# Patient Record
Sex: MALE | Race: OTHER | URBAN - METROPOLITAN AREA
[De-identification: names, ages, dates, MRNs, and addresses within clinical notes are randomized per-mention and may not be internally consistent; named-entity substitution may affect disease eponyms.]

---

## 2019-01-25 ENCOUNTER — INPATIENT (INPATIENT)
Facility: HOSPITAL | Age: 69
LOS: 5 days | Discharge: HOME HEALTH | End: 2019-01-31
Attending: INTERNAL MEDICINE | Admitting: INTERNAL MEDICINE
Payer: MEDICARE

## 2019-01-25 VITALS
RESPIRATION RATE: 20 BRPM | HEART RATE: 64 BPM | OXYGEN SATURATION: 95 % | SYSTOLIC BLOOD PRESSURE: 104 MMHG | DIASTOLIC BLOOD PRESSURE: 55 MMHG

## 2019-01-25 DIAGNOSIS — Z98.890 OTHER SPECIFIED POSTPROCEDURAL STATES: Chronic | ICD-10-CM
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
## 2019-01-25 DIAGNOSIS — Z95.2 PRESENCE OF PROSTHETIC HEART VALVE: Chronic | ICD-10-CM

## 2019-01-25 LAB
ALBUMIN SERPL ELPH-MCNC: 3.8 G/DL — SIGNIFICANT CHANGE UP (ref 3.5–5.2)
ALP SERPL-CCNC: 88 U/L — SIGNIFICANT CHANGE UP (ref 30–115)
ALT FLD-CCNC: 30 U/L — SIGNIFICANT CHANGE UP (ref 0–41)
ANION GAP SERPL CALC-SCNC: 14 MMOL/L — SIGNIFICANT CHANGE UP (ref 7–14)
APPEARANCE UR: CLEAR — SIGNIFICANT CHANGE UP
APTT BLD: 33.8 SEC — SIGNIFICANT CHANGE UP (ref 27–39.2)
AST SERPL-CCNC: 34 U/L — SIGNIFICANT CHANGE UP (ref 0–41)
BASE EXCESS BLDV CALC-SCNC: 17.1 MMOL/L — HIGH (ref -2–2)
BASE EXCESS BLDV CALC-SCNC: 17.9 MMOL/L — HIGH (ref -2–2)
BASOPHILS # BLD AUTO: 0.02 K/UL — SIGNIFICANT CHANGE UP (ref 0–0.2)
BASOPHILS NFR BLD AUTO: 0.3 % — SIGNIFICANT CHANGE UP (ref 0–1)
BILIRUB SERPL-MCNC: 1.5 MG/DL — HIGH (ref 0.2–1.2)
BILIRUB UR-MCNC: NEGATIVE — SIGNIFICANT CHANGE UP
BLD GP AB SCN SERPL QL: SIGNIFICANT CHANGE UP
BUN SERPL-MCNC: 94 MG/DL — CRITICAL HIGH (ref 10–20)
CA-I SERPL-SCNC: 1.08 MMOL/L — LOW (ref 1.12–1.3)
CA-I SERPL-SCNC: 1.11 MMOL/L — LOW (ref 1.12–1.3)
CALCIUM SERPL-MCNC: 9.1 MG/DL — SIGNIFICANT CHANGE UP (ref 8.5–10.1)
CHLORIDE SERPL-SCNC: 84 MMOL/L — LOW (ref 98–110)
CO2 SERPL-SCNC: 39 MMOL/L — HIGH (ref 17–32)
COLOR SPEC: YELLOW — SIGNIFICANT CHANGE UP
CREAT SERPL-MCNC: 3.3 MG/DL — HIGH (ref 0.7–1.5)
DIFF PNL FLD: NEGATIVE — SIGNIFICANT CHANGE UP
EOSINOPHIL # BLD AUTO: 0.11 K/UL — SIGNIFICANT CHANGE UP (ref 0–0.7)
EOSINOPHIL NFR BLD AUTO: 1.7 % — SIGNIFICANT CHANGE UP (ref 0–8)
ETHANOL SERPL-MCNC: <10 MG/DL — HIGH
GAS PNL BLDV: 134 MMOL/L — LOW (ref 136–145)
GAS PNL BLDV: 136 MMOL/L — SIGNIFICANT CHANGE UP (ref 136–145)
GAS PNL BLDV: SIGNIFICANT CHANGE UP
GAS PNL BLDV: SIGNIFICANT CHANGE UP
GLUCOSE BLDC GLUCOMTR-MCNC: 164 MG/DL — HIGH (ref 70–99)
GLUCOSE BLDC GLUCOMTR-MCNC: 243 MG/DL — HIGH (ref 70–99)
GLUCOSE SERPL-MCNC: 199 MG/DL — HIGH (ref 70–99)
GLUCOSE UR QL: NEGATIVE MG/DL — SIGNIFICANT CHANGE UP
HCO3 BLDV-SCNC: 44 MMOL/L — HIGH (ref 22–29)
HCO3 BLDV-SCNC: 44 MMOL/L — HIGH (ref 22–29)
HCT VFR BLD CALC: 35.6 % — LOW (ref 42–52)
HCT VFR BLDA CALC: 26.6 % — LOW (ref 34–44)
HCT VFR BLDA CALC: 37 % — SIGNIFICANT CHANGE UP (ref 34–44)
HGB BLD CALC-MCNC: 12.1 G/DL — LOW (ref 14–18)
HGB BLD CALC-MCNC: 8.7 G/DL — LOW (ref 14–18)
HGB BLD-MCNC: 11.5 G/DL — LOW (ref 14–18)
IMM GRANULOCYTES NFR BLD AUTO: 0.5 % — HIGH (ref 0.1–0.3)
INR BLD: 1.27 RATIO — SIGNIFICANT CHANGE UP (ref 0.65–1.3)
KETONES UR-MCNC: NEGATIVE — SIGNIFICANT CHANGE UP
LACTATE BLDV-MCNC: 2 MMOL/L — HIGH (ref 0.5–1.6)
LACTATE BLDV-MCNC: 2.8 MMOL/L — HIGH (ref 0.5–1.6)
LACTATE SERPL-SCNC: 2.5 MMOL/L — HIGH (ref 0.5–2.2)
LEUKOCYTE ESTERASE UR-ACNC: NEGATIVE — SIGNIFICANT CHANGE UP
LIDOCAIN IGE QN: 43 U/L — SIGNIFICANT CHANGE UP (ref 7–60)
LYMPHOCYTES # BLD AUTO: 1 K/UL — LOW (ref 1.2–3.4)
LYMPHOCYTES # BLD AUTO: 15.3 % — LOW (ref 20.5–51.1)
MAGNESIUM SERPL-MCNC: 3.2 MG/DL — CRITICAL HIGH (ref 1.8–2.4)
MCHC RBC-ENTMCNC: 30.4 PG — SIGNIFICANT CHANGE UP (ref 27–31)
MCHC RBC-ENTMCNC: 32.3 G/DL — SIGNIFICANT CHANGE UP (ref 32–37)
MCV RBC AUTO: 94.2 FL — HIGH (ref 80–94)
MONOCYTES # BLD AUTO: 1.02 K/UL — HIGH (ref 0.1–0.6)
MONOCYTES NFR BLD AUTO: 15.6 % — HIGH (ref 1.7–9.3)
NEUTROPHILS # BLD AUTO: 4.35 K/UL — SIGNIFICANT CHANGE UP (ref 1.4–6.5)
NEUTROPHILS NFR BLD AUTO: 66.6 % — SIGNIFICANT CHANGE UP (ref 42.2–75.2)
NITRITE UR-MCNC: NEGATIVE — SIGNIFICANT CHANGE UP
NRBC # BLD: 0 /100 WBCS — SIGNIFICANT CHANGE UP (ref 0–0)
PCO2 BLDV: 63 MMHG — HIGH (ref 41–51)
PCO2 BLDV: 66 MMHG — HIGH (ref 41–51)
PH BLDV: 7.44 — HIGH (ref 7.26–7.43)
PH BLDV: 7.45 — HIGH (ref 7.26–7.43)
PH UR: 7 — SIGNIFICANT CHANGE UP (ref 5–8)
PLATELET # BLD AUTO: 151 K/UL — SIGNIFICANT CHANGE UP (ref 130–400)
PO2 BLDV: 29 MMHG — SIGNIFICANT CHANGE UP (ref 20–40)
PO2 BLDV: 32 MMHG — SIGNIFICANT CHANGE UP (ref 20–40)
POTASSIUM BLDV-SCNC: 2.7 MMOL/L — LOW (ref 3.3–5.6)
POTASSIUM BLDV-SCNC: 3.1 MMOL/L — LOW (ref 3.3–5.6)
POTASSIUM SERPL-MCNC: 2.7 MMOL/L — CRITICAL LOW (ref 3.5–5)
POTASSIUM SERPL-SCNC: 2.7 MMOL/L — CRITICAL LOW (ref 3.5–5)
PROT SERPL-MCNC: 7.1 G/DL — SIGNIFICANT CHANGE UP (ref 6–8)
PROT UR-MCNC: NEGATIVE MG/DL — SIGNIFICANT CHANGE UP
PROTHROM AB SERPL-ACNC: 14.6 SEC — HIGH (ref 9.95–12.87)
RBC # BLD: 3.78 M/UL — LOW (ref 4.7–6.1)
RBC # FLD: 17.5 % — HIGH (ref 11.5–14.5)
SAO2 % BLDV: 46 % — SIGNIFICANT CHANGE UP
SAO2 % BLDV: 53 % — SIGNIFICANT CHANGE UP
SODIUM SERPL-SCNC: 137 MMOL/L — SIGNIFICANT CHANGE UP (ref 135–146)
SP GR SPEC: 1.02 — SIGNIFICANT CHANGE UP (ref 1.01–1.03)
TROPONIN T SERPL-MCNC: 0.07 NG/ML — CRITICAL HIGH
TYPE + AB SCN PNL BLD: SIGNIFICANT CHANGE UP
UROBILINOGEN FLD QL: 1 MG/DL (ref 0.2–0.2)
WBC # BLD: 6.53 K/UL — SIGNIFICANT CHANGE UP (ref 4.8–10.8)
WBC # FLD AUTO: 6.53 K/UL — SIGNIFICANT CHANGE UP (ref 4.8–10.8)

## 2019-01-25 PROCEDURE — 99222 1ST HOSP IP/OBS MODERATE 55: CPT

## 2019-01-25 RX ORDER — POTASSIUM CHLORIDE 20 MEQ
20 PACKET (EA) ORAL ONCE
Qty: 0 | Refills: 0 | Status: COMPLETED | OUTPATIENT
Start: 2019-01-25 | End: 2019-01-25

## 2019-01-25 RX ORDER — MAGNESIUM SULFATE 500 MG/ML
2 VIAL (ML) INJECTION ONCE
Qty: 0 | Refills: 0 | Status: COMPLETED | OUTPATIENT
Start: 2019-01-25 | End: 2019-01-25

## 2019-01-25 RX ORDER — GLUCAGON INJECTION, SOLUTION 0.5 MG/.1ML
1 INJECTION, SOLUTION SUBCUTANEOUS ONCE
Qty: 0 | Refills: 0 | Status: DISCONTINUED | OUTPATIENT
Start: 2019-01-25 | End: 2019-01-31

## 2019-01-25 RX ORDER — DEXTROSE 50 % IN WATER 50 %
15 SYRINGE (ML) INTRAVENOUS ONCE
Qty: 0 | Refills: 0 | Status: DISCONTINUED | OUTPATIENT
Start: 2019-01-25 | End: 2019-01-31

## 2019-01-25 RX ORDER — POTASSIUM CHLORIDE 20 MEQ
40 PACKET (EA) ORAL ONCE
Qty: 0 | Refills: 0 | Status: COMPLETED | OUTPATIENT
Start: 2019-01-25 | End: 2019-01-25

## 2019-01-25 RX ORDER — INSULIN GLARGINE 100 [IU]/ML
20 INJECTION, SOLUTION SUBCUTANEOUS AT BEDTIME
Qty: 0 | Refills: 0 | Status: DISCONTINUED | OUTPATIENT
Start: 2019-01-25 | End: 2019-01-26

## 2019-01-25 RX ORDER — SODIUM CHLORIDE 9 MG/ML
500 INJECTION, SOLUTION INTRAVENOUS ONCE
Qty: 0 | Refills: 0 | Status: COMPLETED | OUTPATIENT
Start: 2019-01-25 | End: 2019-01-25

## 2019-01-25 RX ORDER — BUDESONIDE AND FORMOTEROL FUMARATE DIHYDRATE 160; 4.5 UG/1; UG/1
2 AEROSOL RESPIRATORY (INHALATION)
Qty: 0 | Refills: 0 | Status: DISCONTINUED | OUTPATIENT
Start: 2019-01-25 | End: 2019-01-31

## 2019-01-25 RX ORDER — INSULIN LISPRO 100/ML
7 VIAL (ML) SUBCUTANEOUS
Qty: 0 | Refills: 0 | Status: DISCONTINUED | OUTPATIENT
Start: 2019-01-25 | End: 2019-01-26

## 2019-01-25 RX ORDER — ATROPINE SULFATE 0.1 MG/ML
0.5 SYRINGE (ML) INJECTION ONCE
Qty: 0 | Refills: 0 | Status: COMPLETED | OUTPATIENT
Start: 2019-01-25 | End: 2019-01-25

## 2019-01-25 RX ORDER — DEXTROSE 50 % IN WATER 50 %
25 SYRINGE (ML) INTRAVENOUS ONCE
Qty: 0 | Refills: 0 | Status: DISCONTINUED | OUTPATIENT
Start: 2019-01-25 | End: 2019-01-31

## 2019-01-25 RX ORDER — DEXTROSE 50 % IN WATER 50 %
12.5 SYRINGE (ML) INTRAVENOUS ONCE
Qty: 0 | Refills: 0 | Status: DISCONTINUED | OUTPATIENT
Start: 2019-01-25 | End: 2019-01-31

## 2019-01-25 RX ORDER — MONTELUKAST 4 MG/1
10 TABLET, CHEWABLE ORAL DAILY
Qty: 0 | Refills: 0 | Status: DISCONTINUED | OUTPATIENT
Start: 2019-01-25 | End: 2019-01-31

## 2019-01-25 RX ORDER — SODIUM CHLORIDE 9 MG/ML
1000 INJECTION, SOLUTION INTRAVENOUS
Qty: 0 | Refills: 0 | Status: DISCONTINUED | OUTPATIENT
Start: 2019-01-25 | End: 2019-01-31

## 2019-01-25 RX ORDER — ATORVASTATIN CALCIUM 80 MG/1
40 TABLET, FILM COATED ORAL AT BEDTIME
Qty: 0 | Refills: 0 | Status: DISCONTINUED | OUTPATIENT
Start: 2019-01-25 | End: 2019-01-31

## 2019-01-25 RX ORDER — TETANUS TOXOID, REDUCED DIPHTHERIA TOXOID AND ACELLULAR PERTUSSIS VACCINE, ADSORBED 5; 2.5; 8; 8; 2.5 [IU]/.5ML; [IU]/.5ML; UG/.5ML; UG/.5ML; UG/.5ML
0.5 SUSPENSION INTRAMUSCULAR ONCE
Qty: 0 | Refills: 0 | Status: COMPLETED | OUTPATIENT
Start: 2019-01-25 | End: 2019-01-25

## 2019-01-25 RX ORDER — TAMSULOSIN HYDROCHLORIDE 0.4 MG/1
0.4 CAPSULE ORAL AT BEDTIME
Qty: 0 | Refills: 0 | Status: DISCONTINUED | OUTPATIENT
Start: 2019-01-25 | End: 2019-01-31

## 2019-01-25 RX ORDER — HEPARIN SODIUM 5000 [USP'U]/ML
5000 INJECTION INTRAVENOUS; SUBCUTANEOUS EVERY 8 HOURS
Qty: 0 | Refills: 0 | Status: DISCONTINUED | OUTPATIENT
Start: 2019-01-25 | End: 2019-01-27

## 2019-01-25 RX ADMIN — SODIUM CHLORIDE 2000 MILLILITER(S): 9 INJECTION, SOLUTION INTRAVENOUS at 15:11

## 2019-01-25 RX ADMIN — Medication 50 GRAM(S): at 15:10

## 2019-01-25 RX ADMIN — Medication 40 MILLIEQUIVALENT(S): at 15:10

## 2019-01-25 RX ADMIN — Medication 0.5 MILLIGRAM(S): at 17:13

## 2019-01-25 RX ADMIN — Medication 50 MILLIEQUIVALENT(S): at 15:10

## 2019-01-25 RX ADMIN — TETANUS TOXOID, REDUCED DIPHTHERIA TOXOID AND ACELLULAR PERTUSSIS VACCINE, ADSORBED 0.5 MILLILITER(S): 5; 2.5; 8; 8; 2.5 SUSPENSION INTRAMUSCULAR at 15:09

## 2019-01-25 NOTE — H&P ADULT - ASSESSMENT
68 year old M PMH HTN. HLD, and DM, CHF, BPH, DLD, asthma/COPD, atrial fibrillation S/P ablation 6 months ago, valvular disorder S/P TAVR 6 months ago brought in by EMS after being found unresponsive on floor with unwitnessed fall down 5 steps, patient was found to be in symptomatic sinus bradycardia.      #symptomatic bradycardia:   r/o hypokalemia as predisposing factor  r/o medication induced bradycardia  hold amiodarone, hold metoprolol, hold digoxin    check digoxin level  r/o ischemia   trend cardiac enzymes   check 2 d echo  f/u cardiology recommendations      # ANGELINA  versus worsening CKD ( unknown baseline)  check ultrasound kidney  will start gentle hydration  daily weight/ accurate in/out  consult nephrology  trend BMP    #hypokalemia in context of ANGELINA  maybe secondary to previous overdiuresis  keep K around 4 , Mg around 2      #diabetes: patient is not on any medication  check FS, basal /bolus insulin if needed    #BPH: on tamsulosin    # dyslipidemia:  on statin     #copd: continue with Symbicort / inhalers       #dvt prophylaxis: heparin 68 year old M PMH HTN. HLD, and DM, CHF, BPH, DLD, asthma/COPD, atrial fibrillation S/P ablation 6 months ago, valvular disorder S/P TAVR 6 months ago brought in by EMS after being found unresponsive on floor with unwitnessed fall down 5 steps, patient was found to be in symptomatic sinus bradycardia.      #syncope/symptomatic bradycardia:   r/o hypokalemia as predisposing factor  r/o medication induced bradycardia  hold amiodarone, hold metoprolol, hold digoxin    check digoxin level  r/o ischemia   trend cardiac enzymes   check 2 d echo  f/u cardiology recommendations  r/o other differentials of syncope: check EEG, f/u neurology      # ANGELINA  versus worsening CKD ( unknown baseline)  check ultrasound kidney  will start gentle hydration  daily weight/ accurate in/out  consult nephrology  trend BMP    #hypokalemia in context of ANGELINA  maybe secondary to previous overdiuresis  keep K around 4 , Mg around 2      #diabetes: patient is on insulin ( does not know the dosage)  check FS, basal /bolus insulin if needed    #BPH: on tamsulosin    # dyslipidemia:  on statin     #copd: continue with Symbicort / inhalers     #dvt prophylaxis: heparin     please clarify the medications in am , patient gave the list to the nurse and list is not found anymore, not in chart also . 68 year old M PMH HTN. HLD, and DM, CHF, BPH, DLD, asthma/COPD, atrial fibrillation S/P ablation 6 months ago, valvular disorder S/P TAVR 6 months ago brought in by EMS after being found unresponsive on floor with unwitnessed fall down 5 steps, patient was found to be in symptomatic sinus bradycardia.      #syncope/symptomatic bradycardia:   r/o hypokalemia as predisposing factor  r/o medication induced bradycardia  hold amiodarone, hold metoprolol, hold digoxin    check digoxin level  r/o ischemia   trend cardiac enzymes   check 2 d echo  f/u cardiology recommendations  r/o other differentials of syncope: check EEG, f/u neurology      # ANGELINA  versus worsening CKD ( unknown baseline)  check ultrasound kidney   gentle hydration, patient received bolus in ED( CAUTION GIVEN CARDIOMEGALY ON xray)  daily weight/ accurate in/out  consult nephrology  trend BMP    #hypokalemia in context of ANGELINA  maybe secondary to previous overdiuresis  keep K around 4 , Mg around 2      #diabetes: patient is on insulin ( does not know the dosage)  check FS, basal /bolus insulin if needed    #BPH: on tamsulosin    # dyslipidemia:  on statin     #copd: continue with Symbicort / inhalers     #dvt prophylaxis: heparin     please clarify the medications in am , patient gave the list to the nurse and list is not found anymore, not in chart also , called son at [phone] ( vinny) and left a message with callback number. 68 year old M PMH HTN. HLD, and DM, CHF, BPH, DLD, asthma/COPD, atrial fibrillation S/P ablation 6 months ago, valvular disorder S/P TAVR 6 months ago brought in by EMS after being found unresponsive on floor with unwitnessed fall down 5 steps, patient was found to be in symptomatic sinus bradycardia.      #syncope/symptomatic bradycardia:   r/o hypokalemia as predisposing factor  r/o medication induced bradycardia  hold amiodarone, hold metoprolol, hold digoxin    check digoxin level  r/o ischemia   trend cardiac enzymes   check 2 d echo  f/u cardiology recommendations  r/o other differentials of syncope: check EEG, f/u neurology      # ANGELINA  versus worsening CKD ( unknown baseline)  check ultrasound kidney   gentle hydration, patient received bolus in ED ( CAUTION GIVEN CARDIOMEGALY ON xray)  daily weight/ accurate in/out  consult nephrology  trend BMP    #hypokalemia in context of ANGELINA  maybe secondary to previous overdiuresis  keep K around 4 , Mg around 2    #recurrent a fib s/p ablation: CHADSVASC 3   will hold Xarelto for now, patient will mostly need pacemaker    #diabetes: patient is on insulin ( does not know the dosage)  check FS, basal /bolus insulin if needed    #BPH: on tamsulosin    # dyslipidemia:  on statin     #copd: continue with Symbicort / inhalers     #dvt prophylaxis: heparin     please clarify the medications in am , patient gave the list to the nurse and list is not found anymore, not in chart also , called son at [phone] ( vinny) and left a message with callback number.

## 2019-01-25 NOTE — CONSULT NOTE ADULT - SUBJECTIVE AND OBJECTIVE BOX
Neurology Consult    CC: r/o seizure    HPI:  Patient is a 68 year old right handed male with PMH significant for HTN. HLD, and DM presents s/p unwitnessed fall down 5 steps.  Patient was found at the bottom of 5 steps at his doctor's office, patient states he has no recollection of the fall.  Patient is unsure if he sustained head trauma, denies LOC and denies AC use.  Patient denied headache, dizziness, chest pain, shortness of breath, nausea, vomiting, abdominal pain.  Patient was very confused on how the fall happened.   PAST MEDICAL & SURGICAL HISTORY:  HLD (hyperlipidemia)  HTN (hypertension)  Diabetes  Cardiac Stent (>10 years ago)  Valve Replacement   FAMILY HISTORY:  No Family History in First Degree Relative  Social History:   - Former Smoker   - Drinks alcohol on daily bases  - Denies use of illicit substances   Allergies  codeine (Unknown)  Home Medications:  Vital Signs Last 24 Hrs  T(C): 35.7 (2019 18:43), Max: 35.7 (2019 18:43)  T(F): 96.2 (2019 18:43), Max: 96.2 (2019 18:43)  HR: 43 (2019 18:43) (40 - 64)  BP: 103/55 (2019 18:43) (97/57 - 104/59)  BP(mean): --  RR: 18 (2019 18:43) (15 - 20)  SpO2: 100% (2019 18:43) (95% - 100%)  Neurologic Examination:  General: Appearance is consistent with chronologic age.  No abnormal facies. The patient is AA&Ox3.  Recent and remote memory intact.  Fund of knowledge is intact and normal.  Language with normal repetition, comprehension and naming.  Nondysarthric.    Cranial nerves: intact VA, VFF.  EOMI w/o nystagmus, skew or reported double vision.  PERRL.  No ptosis/weakness of eyelid closure.  Facial sensation is normal with normal bite.  No facial asymmetry.  Hearing grossly intact b/l.  Palate elevates midline.  Tongue midline.  Motor examination:   Normal tone, bulk and range of motion.  No tenderness, twitching, tremors or involuntary movements.  Formal Muscle Strength Testing: (MRC grade R/L) 5/5 UE; 5/5 LE.  No observable drift.  Reflexes:   2+ b/l biceps and brachioradialis.   Sensory examination:   Intact to light touch and pain, temperature and proprioception and vibration in all extremities.  Cerebellum:   FTN intact with normal DEJUAN in all limbs.  No dysmetria or dysdiadokinesia.    Labs:   CBC Full  -  ( 2019 13:43 )  WBC Count : 6.53 K/uL  Hemoglobin : 11.5 g/dL  Hematocrit : 35.6 %  Platelet Count - Automated : 151 K/uL  Mean Cell Volume : 94.2 fL  Mean Cell Hemoglobin : 30.4 pg  Mean Cell Hemoglobin Concentration : 32.3 g/dL  Auto Neutrophil # : 4.35 K/uL  Auto Lymphocyte # : 1.00 K/uL  Auto Monocyte # : 1.02 K/uL  Auto Eosinophil # : 0.11 K/uL  Auto Basophil # : 0.02 K/uL  Auto Neutrophil % : 66.6 %  Auto Lymphocyte % : 15.3 %  Auto Monocyte % : 15.6 %  Auto Eosinophil % : 1.7 %  Auto Basophil % : 0.3 %        137  |  84<L>  |  94<HH>  ----------------------------<  199<H>  2.7<LL>   |  39<H>  |  3.3<H>    Ca    9.1      2019 13:43  Mg     3.2         TPro  7.1  /  Alb  3.8  /  TBili  1.5<H>  /  DBili  x   /  AST  34  /  ALT  30  /  AlkPhos  88      LIVER FUNCTIONS - ( 2019 13:43 )  Alb: 3.8 g/dL / Pro: 7.1 g/dL / ALK PHOS: 88 U/L / ALT: 30 U/L / AST: 34 U/L / GGT: x           PT/INR - ( 2019 13:43 )   PT: 14.60 sec;   INR: 1.27 ratio         PTT - ( 2019 13:43 )  PTT:33.8 sec  Urinalysis Basic - ( 2019 17:00 )    Color: Yellow / Appearance: Clear / S.020 / pH: x  Gluc: x / Ketone: Negative  / Bili: Negative / Urobili: 1.0 mg/dL   Blood: x / Protein: Negative mg/dL / Nitrite: Negative   Leuk Esterase: Negative / RBC: x / WBC x   Sq Epi: x / Non Sq Epi: x / Bacteria: x      Neuroimaging:    EXAM:  CT BRAIN        PROCEDURE DATE:  2019    INTERPRETATION:  Clinical History / Reason for exam: Trauma.    Technique: Multiple contiguous axial CT images of the head were obtained   from the base of the skull to the vertex without administration of   intravenous contrast.    Comparison: None available at this time.       Findings: The study is slightly limited due to patient motion and motion   artifact. The ventricles, basal cisterns and sulcal pattern are slightly   prominent consistent with parenchymal volume loss. There are scattered   patchy and punctate foci of hypodensities in the bilateral frontal and   parietal periventricular and subcortical white matter which are   nonspecific and without mass effect most likely consistent with chronic   small vessel ischemic changes in a patient of this stated age. There is   no acute mass effect, midline shift or hemorrhage. No extra-axial fluid   collections are identified.    There are no acute fractures or dislocations. The bones of the calvarium   are otherwise intact. Mucosal thickening is noted in the bilateral   ethmoid sinuses. The remaining paranasal sinuses, bilateral mastoid   complexes and globes and orbits are grossly unremarkable.    Beam hardening artifact is noted overlying the brain stem and posterior   fossa which is inherent to CT in this location.      IMPRESSION:     1.  Periventricular and subcortical white matter chronic small vessel   ischemic changes.    2.  No acute fractures, mass effect, midline shift or hemorrhage.     3.  Slightly limited study due to patient motion and motion artifact.

## 2019-01-25 NOTE — H&P ADULT - NSHPPHYSICALEXAM_GEN_ALL_CORE
T(C): 35.7 (01-25-19 @ 18:43), Max: 35.7 (01-25-19 @ 18:43)  HR: 43 (01-25-19 @ 18:43) (40 - 64)  BP: 103/55 (01-25-19 @ 18:43) (97/57 - 104/59)  RR: 18 (01-25-19 @ 18:43) (15 - 20)  SpO2: 100% (01-25-19 @ 18:43) (95% - 100%)    PHYSICAL EXAM:  GENERAL: NAD, well-developed  HEAD:  Atraumatic, Normocephalic  EYES: EOMI, PERRLA, conjunctiva and sclera clear  NECK: Supple, No JVD  CHEST/LUNG: Clear to auscultation bilaterally; No wheeze  HEART: Regular rate and rhythm; No murmurs, rubs, or gallops, bradycardic  ABDOMEN: Soft, Nontender, Nondistended; Bowel sounds present  EXTREMITIES:  2+ Peripheral Pulses, No clubbing, cyanosis, + edema  PSYCH: AAOx3  NEUROLOGY: non-focal  SKIN: No rashes or lesions

## 2019-01-25 NOTE — ED PROVIDER NOTE - PROGRESS NOTE DETAILS
Discussed case with Dr. Gallardo.  Recommends admission to CCU.  No need for transvenous pacemaker.  obs at this time. Discussed case with trauma.  cleared from their standpoint.  admit to medicine. Discussed case with neuro surgery.   no further intervention. ED work up reviewed.  K+ being repleted.  No known kidney issues. Patient having episodes of bradycardia and will consult Cardiology. Discussed case with trauma.  cleared from their standpoint.  admit to medicine.

## 2019-01-25 NOTE — CONSULT NOTE ADULT - SUBJECTIVE AND OBJECTIVE BOX
TRAUMA ACTIVATION LEVEL: Trauma Alert    MECHANISM OF INJURY:      [x] Blunt  	[] MVC	[x] Fall	[] Pedestrian Struck	[] Motorcycle   [] Assault   [] Bicycle collision  [] Sports injury     [] Penetrating  	[] Gun Shot Wound 		[] Stab Wound    GCS: 	E: 4	V: 5	M: 6    68y old m s/p found down at the bottom of 5 steps, ?HT, ?LOC, ?AC  HPI: Patient is a 68 year old male with PMH significant for HTN. HLD, and DM presents s/p unwitnessed fall down 5 steps.  Patient was found at the bottom of 5 steps at his doctor's office, patient states he has no recollection of the fall.  Patient is unsure if he sustained head trauma, denies LOC and denies AC use.  Patient denied headache, dizziness, chest pain, shortness of breath, nausea, vomiting, abdominal pain.  Patient was very confused on how the fall happened.    PAST MEDICAL & SURGICAL HISTORY:  HLD (hyperlipidemia)  HTN (hypertension)  Diabetes    Allergies  codeine (Unknown)    ROS: 10-system review is otherwise negative except HPI above.      Primary Survey:    A - airway intact  B - bilateral breath sounds and good chest rise  C - palpable pulses in all extremities  D - GCS 15 on arrival, VELASQUEZ  Exposure obtained    Vital Signs Last 24 Hrs      Secondary Survey:   General: NAD  HEENT: Normocephalic, atraumatic, EOMI, PEERLA. no scalp lacerations   Neck: Soft, midline trachea. no cspine tenderness  Chest: No chest wall tenderness. or subq  emphysema   Cardiac: S1, S2, RRR  Respiratory: Bilateral breath sounds, clear and equal bilaterally  Abdomen: Soft, non-distended, non-tender, no rebound  Groin: Normal appearing, pelvis stable   Ext: palp radial b/l UE, b/l DP palp in Lower Extrem, lymphedema and venous stasis changes over BL LEs, abrasions over BL hands  Back: no palpable runoff/stepoff/deformity, tender over T1  Rectal: No jose blood, DAVIDSON with good tone      LABS:    Pending    RADIOLOGY & ADDITIONAL STUDIES:    Pending TRAUMA ACTIVATION LEVEL: Trauma Alert    MECHANISM OF INJURY:      [x] Blunt  	[] MVC	[x] Fall	[] Pedestrian Struck	[] Motorcycle   [] Assault   [] Bicycle collision  [] Sports injury     [] Penetrating  	[] Gun Shot Wound 		[] Stab Wound    GCS: 	E: 4	V: 5	M: 6    68y old m s/p found down at the bottom of 5 steps, ?HT, ?LOC, ?AC  HPI: Patient is a 68 year old male with PMH significant for HTN. HLD, and DM presents s/p unwitnessed fall down 5 steps.  Patient was found at the bottom of 5 steps at his doctor's office, patient states he has no recollection of the fall.  Patient is unsure if he sustained head trauma, denies LOC and denies AC use.  Patient denied headache, dizziness, chest pain, shortness of breath, nausea, vomiting, abdominal pain.  Patient was very confused on how the fall happened.    PAST MEDICAL & SURGICAL HISTORY:  HLD (hyperlipidemia)  HTN (hypertension)  Diabetes    Allergies  codeine (Unknown)    ROS: 10-system review is otherwise negative except HPI above.      Primary Survey:    A - airway intact  B - bilateral breath sounds and good chest rise  C - palpable pulses in all extremities  D - GCS 15 on arrival, VELASQUEZ  Exposure obtained    Vital Signs Last 24 Hrs      Secondary Survey:   General: NAD  HEENT: Normocephalic, atraumatic, EOMI, PEERLA. no scalp lacerations   Neck: Soft, midline trachea. no cspine tenderness  Chest: No chest wall tenderness. or subq  emphysema   Cardiac: S1, S2, RRR  Respiratory: Bilateral breath sounds, clear and equal bilaterally  Abdomen: Soft, non-distended, non-tender, no rebound  Groin: Normal appearing, pelvis stable   Ext: palp radial b/l UE, b/l DP palp in Lower Extrem, lymphedema and venous stasis changes over BL LEs, abrasions over BL hands  Back: no palpable runoff/stepoff/deformity, tender over T1  Rectal: No jose blood, DAVIDSON with good tone      LABS:                        11.5   6.53  )-----------( 151      ( 25 Jan 2019 13:43 )             35.6     01-25    137  |  84<L>  |  94<HH>  ----------------------------<  199<H>  2.7<LL>   |  39<H>  |  3.3<H>    Ca    9.1      25 Jan 2019 13:43    TPro  7.1  /  Alb  3.8  /  TBili  1.5<H>  /  DBili  x   /  AST  34  /  ALT  30  /  AlkPhos  88  01-25    CARDIAC MARKERS ( 25 Jan 2019 13:43 )  x     / 0.07 ng/mL / 292 U/L / x     / x        PT/INR - ( 25 Jan 2019 13:43 )   PT: 14.60 sec;   INR: 1.27 ratio         PTT - ( 25 Jan 2019 13:43 )  PTT:33.8 sec    RADIOLOGY & ADDITIONAL STUDIES:  < from: Xray Chest 1 View AP/PA (01.25.19 @ 14:29) >  Impression:      No radiographic evidence of acute cardiopulmonary disease.    < end of copied text >    < from: Xray Pelvis AP only (01.25.19 @ 14:30) >  Interpretation:    No evidence of obvious acute fracture or dislocation. The right femur   neck is limited for evaluation secondary to rotation.    < end of copied text >    < from: CT Head No Cont (01.25.19 @ 14:37) >  IMPRESSION:     1.  Periventricular and subcortical white matter chronic small vessel   ischemic changes.    2.  No acute fractures, mass effect, midline shift or hemorrhage.     3.  Slightly limited study due to patient motion and motion artifact.     < end of copied text >    < from: CT Cervical Spine No Cont (01.25.19 @ 14:51) >    IMPRESSION:     1.  Degenerative changes of the cervical spine C2-3 through C7-T1 as   described above.    2.  Straightening ofnormal cervical lordosis with mild anterolisthesis   of C3 on C4, C4 on C5 and retrolisthesis of C5 posterior on C6.     3.  No acute fractures or dislocations.    < end of copied text >    < from: CT Abdomen and Pelvis w/ IV Cont (01.25.19 @ 14:58) >  IMPRESSION:    1. No CT evidence of acute traumatic intrathoracic or intra-abdominal   pathology.    2.  Mediastinal and lower paraesophageal lymphadenopathy.    3. Indeterminate lucent lesion within the T7 vertebral body. Nonemergent   thoracic spine MRI may be obtained for further evaluation.    < end of copied text >

## 2019-01-25 NOTE — H&P ADULT - NSHPREVIEWOFSYSTEMS_GEN_ALL_CORE
REVIEW OF SYSTEMS:    CONSTITUTIONAL: No weakness, fevers or chills  EYES/ENT: No visual changes;  No vertigo or throat pain   NECK: No pain or stiffness  RESPIRATORY: + cough, NO wheezing, hemoptysis; No shortness of breath  CARDIOVASCULAR: No chest pain or palpitations  GASTROINTESTINAL: No abdominal or epigastric pain. No nausea, vomiting, or hematemesis; No diarrhea or constipation. No melena or hematochezia.  GENITOURINARY: No dysuria, frequency or hematuria  NEUROLOGICAL: No numbness or weakness  SKIN: No itching, burning, rashes, or lesions   All other review of systems is negative unless indicated above.

## 2019-01-25 NOTE — H&P ADULT - HISTORY OF PRESENT ILLNESS
68 year old M PMH HTN. HLD, and DM, CHF, BPH, DLD, asthma/COPD, atrial fibrillation S/P ablation 6 months ago, valvular disorder S/P TAVR 6 months ago brought in by EMS after being found unresponsive on floor with unwitnessed fall down 5 steps.  Patient was found at the bottom of 5 steps at his doctor's office, patient states he has no recollection of the fall.  Patient is unsure if he sustained head trauma, denies LOC and denies AC use. Patient mentioned that for the last week he was feeling dizzy and having unsteady gait from dizziness, she checked his pulse and it was low, sometimes reaching 40 , and his blood pressure was intermittently low. Patient  denies any chest pain during the last week, no SOB denied , nausea, vomiting, abdominal pain.  Patient was very confused on how the fall happened.  Patient was trauma alert in ED   In Ed patient was found in sinus bradycardia , and was given atropin in ED.  To note , patient was on bumetanide  and it was stopped by his nephrologist for dehydration and josé luis during the last week. 68 year old M PMH HTN. HLD, and DM, CHF, BPH, DLD, asthma/COPD, atrial fibrillation S/P ablation 9 months ago, valvular disorder S/P TAVR 9 months ago brought in by EMS after being found unresponsive on floor with urine and fecal incontinence and unwitnessed fall down 5 steps.  Patient was found at the bottom of 5 steps at his doctor's office, patient states he has no recollection of the fall.  Patient is unsure if he sustained head trauma, denies LOC and denies AC use. Patient mentioned that for the last week he was feeling dizzy and having unsteady gait from dizziness, she checked his pulse and it was low, sometimes reaching 40 , and his blood pressure was intermittently low. Patient mentioned he had another syncope episode around 2 month ago, he had was driving , felt dizzy, he has to pull over then he lost his consciousness and did not remember what happen. Patient  denies any chest pain during the last week, no SOB denied , nausea, vomiting, abdominal pain.  Patient was very confused on how the fall happened.  Patient was trauma alert in ED . In Ed patient was found in sinus bradycardia , and was given atropin in ED.  To note , patient was on bumetanide  and it was stopped by his nephrologist for dehydration and josé luis during the last week.

## 2019-01-25 NOTE — CONSULT NOTE ADULT - SUBJECTIVE AND OBJECTIVE BOX
HPI:  Patient is a 68 year old male with PMH significant for HTN. HLD, and DM presents s/p unwitnessed fall down 5 steps.  Patient was found at the bottom of 5 steps at his doctor's office, patient states he has no recollection of the fall.  Patient is unsure if he sustained head trauma, denies LOC and denies AC use.  Patient denied headache, dizziness, chest pain, shortness of breath, nausea, vomiting, abdominal pain.  Patient was very confused on how the fall happened.    Neurosurgery was called to evaluate T7 hyperlucency on ct scan. Pt states he has chronic low back pain but denies any pain in the mid back.       PAST MEDICAL & SURGICAL HISTORY:  HLD (hyperlipidemia)  HTN (hypertension)  Diabetes      Home Medications:      Allergies    codeine (Unknown)    Intolerances        MEDICATIONS  (STANDING):    MEDICATIONS  (PRN):      ICU Vital Signs Last 24 Hrs  T(C): 35 (25 Jan 2019 14:30), Max: 35 (25 Jan 2019 14:30)  T(F): 95 (25 Jan 2019 14:30), Max: 95 (25 Jan 2019 14:30)  HR: 40 (25 Jan 2019 17:11) (40 - 64)  BP: 103/55 (25 Jan 2019 17:11) (97/57 - 104/59)  BP(mean): --  ABP: --  ABP(mean): --  RR: 15 (25 Jan 2019 17:11) (15 - 20)  SpO2: 100% (25 Jan 2019 17:11) (95% - 100%)      I&O's Detail    25 Jan 2019 07:01  -  25 Jan 2019 17:56  --------------------------------------------------------  IN:  Total IN: 0 mL    OUT:    Voided: 600 mL  Total OUT: 600 mL    Total NET: -600 mL          CBC Full  -  ( 25 Jan 2019 13:43 )  WBC Count : 6.53 K/uL  Hemoglobin : 11.5 g/dL  Hematocrit : 35.6 %  Platelet Count - Automated : 151 K/uL  Mean Cell Volume : 94.2 fL  Mean Cell Hemoglobin : 30.4 pg  Mean Cell Hemoglobin Concentration : 32.3 g/dL  Auto Neutrophil # : 4.35 K/uL  Auto Lymphocyte # : 1.00 K/uL  Auto Monocyte # : 1.02 K/uL  Auto Eosinophil # : 0.11 K/uL  Auto Basophil # : 0.02 K/uL  Auto Neutrophil % : 66.6 %  Auto Lymphocyte % : 15.3 %  Auto Monocyte % : 15.6 %  Auto Eosinophil % : 1.7 %  Auto Basophil % : 0.3 %    01-25    137  |  84<L>  |  94<HH>  ----------------------------<  199<H>  2.7<LL>   |  39<H>  |  3.3<H>    Ca    9.1      25 Jan 2019 13:43    TPro  7.1  /  Alb  3.8  /  TBili  1.5<H>  /  DBili  x   /  AST  34  /  ALT  30  /  AlkPhos  88  01-25    CARDIAC MARKERS ( 25 Jan 2019 13:43 )  x     / 0.07 ng/mL / 292 U/L / x     / x              Neuro Exam:  awake and alert  follows commands  no point tenderness to spine  able to move all 4 extremities with equal strength      Imaging:  < from: CT Head No Cont (01.25.19 @ 14:37) >  IMPRESSION:     1.  Periventricular and subcortical white matter chronic small vessel   ischemic changes.    2.  No acute fractures, mass effect, midline shift or hemorrhage.     3.  Slightly limited study due to patient motion and motion artifact.       NITZA GIRON M.D., ATTENDING RADIOLOGIST  This document has been electronically signed. Jan 25 2019  2:58PM    < end of copied text >    < from: CT Cervical Spine No Cont (01.25.19 @ 14:51) >    IMPRESSION:     1.  Degenerative changes of the cervical spine C2-3 through C7-T1 as   described above.    2.  Straightening ofnormal cervical lordosis with mild anterolisthesis   of C3 on C4, C4 on C5 and retrolisthesis of C5 posterior on C6.     3.  No acute fractures or dislocations.        NITZA GIRON M.D., ATTENDING RADIOLOGIST  This document has been electronically signed. Jan 25 2019  4:17PM    < end of copied text >    < from: CT Abdomen and Pelvis w/ IV Cont (01.25.19 @ 14:58) >  OTHER: Aortic atherosclerosis.     IMPRESSION:    1. No CT evidence of acute traumatic intrathoracic or intra-abdominal   pathology.    2.  Mediastinal and lower paraesophageal lymphadenopathy.    3. Indeterminate lucent lesion within the T7 vertebral body. Nonemergent   thoracic spine MRI may be obtained for further evaluation.        OWODY GAONA M.D., RESIDENT RADIOLOGIST  This document has been electronically signed.  YENNI GRANADOS M.D., ATTENDING RADIOLOGIST  This document has been electronically signed. Jan 25 2019  3:45PM        < end of copied text >          Assessment/Plan  s/p fall with hyperlucency on chest CT of T7  no neurosurgical intervention  can have MRI as outpatient of T spine  if necessary can follow up with Dr. Rehman [phone]  d/w attending

## 2019-01-25 NOTE — ED PROVIDER NOTE - SECONDARY DIAGNOSIS.
Hypokalemia ANGELINA (acute kidney injury) Closed head injury Elevated troponin Altered mental status

## 2019-01-25 NOTE — ED ADULT NURSE NOTE - OBJECTIVE STATEMENT
s/p unwitnessed fall outside podiatrist office found outside on floor unresponsive as per staff at office. EMS called upon arrival patient unresponsive. Pt awake but confused upon arrival to ED.

## 2019-01-25 NOTE — CONSULT NOTE ADULT - ASSESSMENT
ASSESSMENT:  68y old m s/p found down at the bottom of 5 steps, ?HT, ?LOC, ?AC    PLAN:    - f/u pan scan  - f/u labs ASSESSMENT:  68y old m s/p found down at the bottom of 5 steps, ?HT, ?LOC, ?AC    PLAN:    -no acute surgical intervention at this time  -cleared from trauma

## 2019-01-25 NOTE — H&P ADULT - NSHPLABSRESULTS_GEN_ALL_CORE
11.5   6.53  )-----------( 151      ( 2019 13:43 )             35.6         137  |  84<L>  |  94<HH>  ----------------------------<  199<H>  2.7<LL>   |  39<H>  |  3.3<H>    Ca    9.1      2019 13:43  Mg     3.2         TPro  7.1  /  Alb  3.8  /  TBili  1.5<H>  /  DBili  x   /  AST  34  /  ALT  30  /  AlkPhos  88              Urinalysis Basic - ( 2019 17:00 )    Color: Yellow / Appearance: Clear / S.020 / pH: x  Gluc: x / Ketone: Negative  / Bili: Negative / Urobili: 1.0 mg/dL   Blood: x / Protein: Negative mg/dL / Nitrite: Negative   Leuk Esterase: Negative / RBC: x / WBC x   Sq Epi: x / Non Sq Epi: x / Bacteria: x        PT/INR - ( 2019 13:43 )   PT: 14.60 sec;   INR: 1.27 ratio         PTT - ( 2019 13:43 )  PTT:33.8 sec    Lactate Trend   @ 13:43 Lactate:2.5       CARDIAC MARKERS ( 2019 13:43 )  x     / 0.07 ng/mL / 292 U/L / x     / x        POCT Blood Glucose.: 243 mg/dL (2019 21:55)    < from: 12 Lead ECG (19 @ 13:57) >    Diagnosis Line Sinus bradycardia  ST & T wave abnormality, consider lateral ischemia  Abnormal ECG    < end of copied text >  < from: CT Abdomen and Pelvis w/ IV Cont (19 @ 14:58) >    1. No CT evidence of acute traumatic intrathoracic or intra-abdominal   pathology.    2.  Mediastinal and lower paraesophageal lymphadenopathy.    3. Indeterminate lucent lesion within the T7 vertebral body. Nonemergent   thoracic spine MRI may be obtained for further evaluation.    < end of copied text >    < from: CT Head No Cont (19 @ 14:37) >    1.  Periventricular and subcortical white matter chronic small vessel   ischemic changes.    2.  No acute fractures, mass effect, midline shift or hemorrhage.     3.  Slightly limited study due to patient motion and motion artifact.         < end of copied text >    < from: CT Cervical Spine No Cont (19 @ 14:51) >      1.  Degenerative changes of the cervical spine C2-3 through C7-T1 as   described above.    2.  Straightening of normal cervical lordosis with mild anterolisthesis   of C3 on C4, C4 on C5 and retrolisthesis of C5 posterior on C6.     3.  No acute fractures or dislocations.

## 2019-01-25 NOTE — ED PROVIDER NOTE - OBJECTIVE STATEMENT
68 y.o male with hx of CHF, HLD, GERD, anemia, BPH, presents to the ED for evaluation s/p fall w/ LOC.  Per EMS pt was outside podiatrist office when staff her thump.  Found pt laying at bottom of 5 stairs w/ + LOC.  Called EMS and upon arrival appeared confused and was incontinent of urine. Hypertensive to 170s w/ GCS 14.  Upon arrival to the ED pt is complaning of *** 68 y.o male with hx of CHF, HLD, GERD, anemia, BPH, presents to the ED for evaluation s/p fall w/ LOC.  Per EMS pt was outside podiatrist office when staff her thump.  Found pt laying at bottom of 5 stairs w/ + LOC.  Called EMS and upon arrival appeared confused and was incontinent of urine. Hypertensive to 170s w/ GCS 14.  Upon arrival to the ED pt has no complaints.  Pt is confused and is repetitive.  Denies any complaints at this time.  HPI limited secondary to AMS.  Unlcear if pt still takes ASA/eliquis. 68 y.o male with hx of CHF, HLD, GERD, anemia, BPH, presents to the ED for evaluation s/p fall w/ LOC.  Per EMS pt was outside podiatrist office when staff her thump.  Found pt laying at bottom of 5 stairs w/ + LOC.  Called EMS and upon arrival appeared confused and was incontinent of urine. Hypertensive to 170s w/ GCS 14.  Upon arrival to the ED pt has no complaints.  Pt is confused and is repetitive.  Denies any complaints at this time.  HPI limited secondary to AMS.  Unclear if pt still takes ASA/eliquis.

## 2019-01-25 NOTE — ED PROVIDER NOTE - PHYSICAL EXAMINATION
CONST: Well appearing in NAD  HEAD: NC/AT  EYES: PERRL, EOMI, Sclera and conjunctiva clear.  ENT: No nasal discharge. Oropharynx normal appearing, no erythema or exudates. Uvula midline.  NECK: No midline Cervical tenderness, + TTP over T1, no midline lumbar tenderness   CARD: Normal S1 S2; Normal rate and rhythm  RESP: Equal BS B/L, No wheezes, rhonchi or rales. No distress  GI: Soft, non-tender, non-distended, obese, old ecchymosis of abdominal wall.   MS: + superficial abrasions of bilateral knees, Normal ROM in all extremities. No edema of lower extremities, no calf pain, radial pulses 2+ bilaterally  SKIN: Warm, dry, no acute rashes. Good turgor  NEURO: GCS 14, A&Ox1, no facial asymmetry,  No focal deficits. Strength 5/5 with no sensory deficits

## 2019-01-25 NOTE — CONSULT NOTE ADULT - ASSESSMENT
Patient is a 68 year old right handed male with PMH significant for HTN. HLD, and DM presents s/p unwitnessed fall down 5 steps. No focal findings on neuro exam. Of note when examining patient he had both urinary and bowel incontinence.     Plan:   - EEG   - Check Mg levels  - Check B12, Folate, thiamine.      Neuro Attending note will follow

## 2019-01-25 NOTE — ED PROVIDER NOTE - MEDICAL DECISION MAKING DETAILS
I personally evaluated the patient. I reviewed the Resident’s or Physician Assistant’s note (as assigned above), and agree with the findings and plan except as documented in my note. Patient s/p fall, found to be bradycardic, patient evaluated by Dr. Almeida in the ED. Patient admitted

## 2019-01-25 NOTE — ED PROVIDER NOTE - ATTENDING CONTRIBUTION TO CARE
68 year old M PMH HTN. HLD, and DM presents s/p unwitnessed fall down 5 steps.  Patient was found at the bottom of 5 steps at his doctor's office, patient states he has no recollection of the fall.  Patient is unsure if he sustained head trauma, denies LOC and denies AC use.  Patient denied headache, dizziness, chest pain, shortness of breath, nausea, vomiting, abdominal pain.  Patient was very confused on how the fall happened.    Patient seen and evaluated.  Trauma alert called as patient has head trauma with GCS 14, was an EMS prenotification. Primary survery intact. GCS 14 for confusion. VS reviewed. Bedside FAST exam done and negative.  Large bore IV placed. Portable CXR and pelvis x-rays show no acute traumatic pathology.  Secondary survey shows no significant injury.  Will send appropriate labs and getting appropriate trauma imaging.  Will follow up work up and recommendations from trauma. Patient also being closely monitored on Tele and Troponin in/BNP sent as picture may have started as syncope.  Patient was also incontinent and new onset seizure considered. Will send appropiate labs, trauma imaging, EKG.  Will consult Neurology and follow recommendations of trauma.  Patient will require admission upon completion of work up.

## 2019-01-26 LAB
ALBUMIN SERPL ELPH-MCNC: 3.6 G/DL — SIGNIFICANT CHANGE UP (ref 3.5–5.2)
ALP SERPL-CCNC: 88 U/L — SIGNIFICANT CHANGE UP (ref 30–115)
ALT FLD-CCNC: 28 U/L — SIGNIFICANT CHANGE UP (ref 0–41)
AMPHET UR-MCNC: NEGATIVE — SIGNIFICANT CHANGE UP
ANION GAP SERPL CALC-SCNC: 11 MMOL/L — SIGNIFICANT CHANGE UP (ref 7–14)
ANION GAP SERPL CALC-SCNC: 16 MMOL/L — HIGH (ref 7–14)
AST SERPL-CCNC: 31 U/L — SIGNIFICANT CHANGE UP (ref 0–41)
BARBITURATES UR SCN-MCNC: NEGATIVE — SIGNIFICANT CHANGE UP
BASOPHILS # BLD AUTO: 0.03 K/UL — SIGNIFICANT CHANGE UP (ref 0–0.2)
BASOPHILS NFR BLD AUTO: 0.4 % — SIGNIFICANT CHANGE UP (ref 0–1)
BENZODIAZ UR-MCNC: NEGATIVE — SIGNIFICANT CHANGE UP
BILIRUB SERPL-MCNC: 1.5 MG/DL — HIGH (ref 0.2–1.2)
BUN SERPL-MCNC: 82 MG/DL — CRITICAL HIGH (ref 10–20)
BUN SERPL-MCNC: 90 MG/DL — CRITICAL HIGH (ref 10–20)
CALCIUM SERPL-MCNC: 8.8 MG/DL — SIGNIFICANT CHANGE UP (ref 8.5–10.1)
CALCIUM SERPL-MCNC: 8.8 MG/DL — SIGNIFICANT CHANGE UP (ref 8.5–10.1)
CHLORIDE SERPL-SCNC: 84 MMOL/L — LOW (ref 98–110)
CHLORIDE SERPL-SCNC: 85 MMOL/L — LOW (ref 98–110)
CO2 SERPL-SCNC: 35 MMOL/L — HIGH (ref 17–32)
CO2 SERPL-SCNC: 40 MMOL/L — HIGH (ref 17–32)
COCAINE METAB.OTHER UR-MCNC: NEGATIVE — SIGNIFICANT CHANGE UP
CREAT SERPL-MCNC: 2.8 MG/DL — HIGH (ref 0.7–1.5)
CREAT SERPL-MCNC: 2.9 MG/DL — HIGH (ref 0.7–1.5)
DIGOXIN SERPL-MCNC: <0.3 NG/ML — LOW (ref 0.8–2)
EOSINOPHIL # BLD AUTO: 0.11 K/UL — SIGNIFICANT CHANGE UP (ref 0–0.7)
EOSINOPHIL NFR BLD AUTO: 1.5 % — SIGNIFICANT CHANGE UP (ref 0–8)
ESTIMATED AVERAGE GLUCOSE: 166 MG/DL — HIGH (ref 68–114)
GLUCOSE BLDC GLUCOMTR-MCNC: 205 MG/DL — HIGH (ref 70–99)
GLUCOSE BLDC GLUCOMTR-MCNC: 229 MG/DL — HIGH (ref 70–99)
GLUCOSE BLDC GLUCOMTR-MCNC: 300 MG/DL — HIGH (ref 70–99)
GLUCOSE BLDC GLUCOMTR-MCNC: 305 MG/DL — HIGH (ref 70–99)
GLUCOSE SERPL-MCNC: 161 MG/DL — HIGH (ref 70–99)
GLUCOSE SERPL-MCNC: 190 MG/DL — HIGH (ref 70–99)
HBA1C BLD-MCNC: 7.4 % — HIGH (ref 4–5.6)
HCT VFR BLD CALC: 36.2 % — LOW (ref 42–52)
HGB BLD-MCNC: 11.4 G/DL — LOW (ref 14–18)
IMM GRANULOCYTES NFR BLD AUTO: 0.3 % — SIGNIFICANT CHANGE UP (ref 0.1–0.3)
LYMPHOCYTES # BLD AUTO: 0.84 K/UL — LOW (ref 1.2–3.4)
LYMPHOCYTES # BLD AUTO: 11.3 % — LOW (ref 20.5–51.1)
MCHC RBC-ENTMCNC: 30.3 PG — SIGNIFICANT CHANGE UP (ref 27–31)
MCHC RBC-ENTMCNC: 31.5 G/DL — LOW (ref 32–37)
MCV RBC AUTO: 96.3 FL — HIGH (ref 80–94)
METHADONE UR-MCNC: NEGATIVE — SIGNIFICANT CHANGE UP
MONOCYTES # BLD AUTO: 0.82 K/UL — HIGH (ref 0.1–0.6)
MONOCYTES NFR BLD AUTO: 11 % — HIGH (ref 1.7–9.3)
NEUTROPHILS # BLD AUTO: 5.63 K/UL — SIGNIFICANT CHANGE UP (ref 1.4–6.5)
NEUTROPHILS NFR BLD AUTO: 75.5 % — HIGH (ref 42.2–75.2)
NRBC # BLD: 0 /100 WBCS — SIGNIFICANT CHANGE UP (ref 0–0)
NT-PROBNP SERPL-SCNC: 1320 PG/ML — HIGH (ref 0–300)
OPIATES UR-MCNC: NEGATIVE — SIGNIFICANT CHANGE UP
PCP SPEC-MCNC: SIGNIFICANT CHANGE UP
PLATELET # BLD AUTO: 165 K/UL — SIGNIFICANT CHANGE UP (ref 130–400)
POTASSIUM SERPL-MCNC: 3.4 MMOL/L — LOW (ref 3.5–5)
POTASSIUM SERPL-MCNC: 3.5 MMOL/L — SIGNIFICANT CHANGE UP (ref 3.5–5)
POTASSIUM SERPL-SCNC: 3.4 MMOL/L — LOW (ref 3.5–5)
POTASSIUM SERPL-SCNC: 3.5 MMOL/L — SIGNIFICANT CHANGE UP (ref 3.5–5)
PROPOXYPHENE QUALITATIVE URINE RESULT: NEGATIVE — SIGNIFICANT CHANGE UP
PROT SERPL-MCNC: 7.3 G/DL — SIGNIFICANT CHANGE UP (ref 6–8)
RBC # BLD: 3.76 M/UL — LOW (ref 4.7–6.1)
RBC # FLD: 17.5 % — HIGH (ref 11.5–14.5)
SODIUM SERPL-SCNC: 135 MMOL/L — SIGNIFICANT CHANGE UP (ref 135–146)
SODIUM SERPL-SCNC: 136 MMOL/L — SIGNIFICANT CHANGE UP (ref 135–146)
TROPONIN T SERPL-MCNC: 0.06 NG/ML — CRITICAL HIGH
WBC # BLD: 7.45 K/UL — SIGNIFICANT CHANGE UP (ref 4.8–10.8)
WBC # FLD AUTO: 7.45 K/UL — SIGNIFICANT CHANGE UP (ref 4.8–10.8)

## 2019-01-26 PROCEDURE — 99221 1ST HOSP IP/OBS SF/LOW 40: CPT

## 2019-01-26 RX ORDER — INSULIN LISPRO 100/ML
3 VIAL (ML) SUBCUTANEOUS ONCE
Qty: 0 | Refills: 0 | Status: COMPLETED | OUTPATIENT
Start: 2019-01-26 | End: 2019-01-26

## 2019-01-26 RX ORDER — BUDESONIDE AND FORMOTEROL FUMARATE DIHYDRATE 160; 4.5 UG/1; UG/1
2 AEROSOL RESPIRATORY (INHALATION)
Qty: 0 | Refills: 0 | COMMUNITY

## 2019-01-26 RX ORDER — INSULIN GLARGINE 100 [IU]/ML
21 INJECTION, SOLUTION SUBCUTANEOUS AT BEDTIME
Qty: 0 | Refills: 0 | Status: DISCONTINUED | OUTPATIENT
Start: 2019-01-27 | End: 2019-01-31

## 2019-01-26 RX ORDER — INSULIN GLARGINE 100 [IU]/ML
20 INJECTION, SOLUTION SUBCUTANEOUS ONCE
Qty: 0 | Refills: 0 | Status: COMPLETED | OUTPATIENT
Start: 2019-01-26 | End: 2019-01-26

## 2019-01-26 RX ORDER — POTASSIUM CHLORIDE 20 MEQ
40 PACKET (EA) ORAL ONCE
Qty: 0 | Refills: 0 | Status: COMPLETED | OUTPATIENT
Start: 2019-01-26 | End: 2019-01-26

## 2019-01-26 RX ORDER — INSULIN GLARGINE 100 [IU]/ML
21 INJECTION, SOLUTION SUBCUTANEOUS AT BEDTIME
Qty: 0 | Refills: 0 | Status: DISCONTINUED | OUTPATIENT
Start: 2019-01-26 | End: 2019-01-26

## 2019-01-26 RX ORDER — SIMETHICONE 80 MG/1
80 TABLET, CHEWABLE ORAL ONCE
Qty: 0 | Refills: 0 | Status: COMPLETED | OUTPATIENT
Start: 2019-01-26 | End: 2019-01-26

## 2019-01-26 RX ORDER — AMIODARONE HYDROCHLORIDE 400 MG/1
1 TABLET ORAL
Qty: 0 | Refills: 0 | COMMUNITY

## 2019-01-26 RX ORDER — INSULIN LISPRO 100/ML
8 VIAL (ML) SUBCUTANEOUS
Qty: 0 | Refills: 0 | Status: DISCONTINUED | OUTPATIENT
Start: 2019-01-26 | End: 2019-01-31

## 2019-01-26 RX ORDER — INSULIN LISPRO 100/ML
7 VIAL (ML) SUBCUTANEOUS
Qty: 0 | Refills: 0 | Status: DISCONTINUED | OUTPATIENT
Start: 2019-01-26 | End: 2019-01-26

## 2019-01-26 RX ORDER — INSULIN LISPRO 100/ML
VIAL (ML) SUBCUTANEOUS
Qty: 0 | Refills: 0 | Status: DISCONTINUED | OUTPATIENT
Start: 2019-01-26 | End: 2019-01-31

## 2019-01-26 RX ORDER — TAMSULOSIN HYDROCHLORIDE 0.4 MG/1
1 CAPSULE ORAL
Qty: 0 | Refills: 0 | COMMUNITY

## 2019-01-26 RX ORDER — ATORVASTATIN CALCIUM 80 MG/1
1 TABLET, FILM COATED ORAL
Qty: 0 | Refills: 0 | COMMUNITY

## 2019-01-26 RX ORDER — METOPROLOL TARTRATE 50 MG
1 TABLET ORAL
Qty: 0 | Refills: 0 | COMMUNITY

## 2019-01-26 RX ORDER — MONTELUKAST 4 MG/1
1 TABLET, CHEWABLE ORAL
Qty: 0 | Refills: 0 | COMMUNITY

## 2019-01-26 RX ORDER — CHLORHEXIDINE GLUCONATE 213 G/1000ML
1 SOLUTION TOPICAL
Qty: 0 | Refills: 0 | Status: DISCONTINUED | OUTPATIENT
Start: 2019-01-26 | End: 2019-01-31

## 2019-01-26 RX ADMIN — HEPARIN SODIUM 5000 UNIT(S): 5000 INJECTION INTRAVENOUS; SUBCUTANEOUS at 15:06

## 2019-01-26 RX ADMIN — INSULIN GLARGINE 20 UNIT(S): 100 INJECTION, SOLUTION SUBCUTANEOUS at 22:00

## 2019-01-26 RX ADMIN — MONTELUKAST 10 MILLIGRAM(S): 4 TABLET, CHEWABLE ORAL at 12:06

## 2019-01-26 RX ADMIN — Medication 7 UNIT(S): at 08:30

## 2019-01-26 RX ADMIN — Medication 7 UNIT(S): at 12:06

## 2019-01-26 RX ADMIN — INSULIN GLARGINE 20 UNIT(S): 100 INJECTION, SOLUTION SUBCUTANEOUS at 00:32

## 2019-01-26 RX ADMIN — BUDESONIDE AND FORMOTEROL FUMARATE DIHYDRATE 2 PUFF(S): 160; 4.5 AEROSOL RESPIRATORY (INHALATION) at 20:50

## 2019-01-26 RX ADMIN — HEPARIN SODIUM 5000 UNIT(S): 5000 INJECTION INTRAVENOUS; SUBCUTANEOUS at 21:30

## 2019-01-26 RX ADMIN — TAMSULOSIN HYDROCHLORIDE 0.4 MILLIGRAM(S): 0.4 CAPSULE ORAL at 21:35

## 2019-01-26 RX ADMIN — Medication 40 MILLIEQUIVALENT(S): at 10:37

## 2019-01-26 RX ADMIN — ATORVASTATIN CALCIUM 40 MILLIGRAM(S): 80 TABLET, FILM COATED ORAL at 21:31

## 2019-01-26 RX ADMIN — Medication 3 UNIT(S): at 13:00

## 2019-01-26 RX ADMIN — Medication 7 UNIT(S): at 16:44

## 2019-01-26 RX ADMIN — BUDESONIDE AND FORMOTEROL FUMARATE DIHYDRATE 2 PUFF(S): 160; 4.5 AEROSOL RESPIRATORY (INHALATION) at 08:45

## 2019-01-26 RX ADMIN — SIMETHICONE 80 MILLIGRAM(S): 80 TABLET, CHEWABLE ORAL at 22:27

## 2019-01-26 RX ADMIN — HEPARIN SODIUM 5000 UNIT(S): 5000 INJECTION INTRAVENOUS; SUBCUTANEOUS at 06:19

## 2019-01-26 RX ADMIN — Medication 4: at 16:45

## 2019-01-26 NOTE — PATIENT PROFILE ADULT - NSPROEDALEARNPREF_GEN_A_NUR
audio/verbal instruction/computer/internet/group instruction/individual instruction/skill demonstration/video/pictorial/written material

## 2019-01-26 NOTE — PATIENT PROFILE ADULT - NSPROEDALEARNPREFOTH_GEN_A_NUR
audio/group instruction/computer/internet/pictorial/skill demonstration/verbal instruction/video/individual instruction/written material

## 2019-01-26 NOTE — CONSULT NOTE ADULT - SUBJECTIVE AND OBJECTIVE BOX
HISTORY OF PRESENT ILLNESS:     This is a 68 years old male patient with  PMHx of AS s/p TAVR,atrial fibrillation S/P ablation both around 9 months ago, HTN. HLD, and DM, asthma/COPD,  brought in by EMS after being found unresponsive on floor with unwitnessed fall, He didnt remember the event , he lost his consciousness suddenly, denies previous symptoms of chest sob or palpitation. found by EMS unresponsive, with urine and fecal incontinence. In ED patient found bradycardic with significant sinus bradycardia. Patient mentioned he had another syncope episode around 2 month ago, he had was driving , felt dizzy, he has to pull over then he lost his consciousness and didnt remember what happen. Patient is aware about low heart rate with possible need of PPM in the future. In review of his medication he is on metoprolol prn didnt remember when last time he took it.   To note , patient was on bumetanide  and it was stopped by his nephrologist for dehydration and josé luis during the last week.   In ED labs showed significant hypokalemia with K = 2.6   patient seen at bedside, denies active chest pain or palpitations. He complains of mild dyspnea on exertion for the last few months.     PAST MEDICAL & SURGICAL HISTORY:  HLD (hyperlipidemia)  HTN (hypertension)  Diabetes  H/O prior ablation treatment  S/P TAVR (transcatheter aortic valve replacement)    FAMILY HISTORY:  Family history of hypertension (Father)    Allergies    codeine (Unknown)    Intolerances    	  Home Medications:  amiodarone 200 mg oral tablet: 1 tab(s) orally once a day (25 Jan 2019 22:14)  atorvastatin 40 mg oral tablet: 1 tab(s) orally once a day (25 Jan 2019 22:14)  digoxin 250 mcg (0.25 mg) oral tablet: 1 tab(s) orally once a day (25 Jan 2019 22:14)  metoprolol succinate 100 mg oral tablet, extended release: 1 tab(s) orally once a day (25 Jan 2019 22:14)  montelukast 10 mg oral tablet: 1 tab(s) orally once a day (25 Jan 2019 22:14)  Symbicort 160 mcg-4.5 mcg/inh inhalation aerosol: 2 puff(s) inhaled 2 times a day (25 Jan 2019 22:14)  tamsulosin 0.4 mg oral capsule: 1 cap(s) orally once a day (25 Jan 2019 22:14)    MEDICATIONS  (STANDING):  atorvastatin 40 milliGRAM(s) Oral at bedtime  buDESOnide 160 MICROgram(s)/formoterol 4.5 MICROgram(s) Inhaler 2 Puff(s) Inhalation two times a day  dextrose 5%. 1000 milliLiter(s) (50 mL/Hr) IV Continuous <Continuous>  dextrose 50% Injectable 12.5 Gram(s) IV Push once  dextrose 50% Injectable 25 Gram(s) IV Push once  dextrose 50% Injectable 25 Gram(s) IV Push once  heparin  Injectable 5000 Unit(s) SubCutaneous every 8 hours  insulin glargine Injectable (LANTUS) 20 Unit(s) SubCutaneous at bedtime  insulin lispro Injectable (HumaLOG) 7 Unit(s) SubCutaneous before breakfast  insulin lispro Injectable (HumaLOG) 7 Unit(s) SubCutaneous before lunch  insulin lispro Injectable (HumaLOG) 7 Unit(s) SubCutaneous before dinner  montelukast 10 milliGRAM(s) Oral daily  tamsulosin 0.4 milliGRAM(s) Oral at bedtime    MEDICATIONS  (PRN):  dextrose 40% Gel 15 Gram(s) Oral once PRN Blood Glucose LESS THAN 70 milliGRAM(s)/deciliter  glucagon  Injectable 1 milliGRAM(s) IntraMuscular once PRN Glucose LESS THAN 70 milligrams/deciliter        SOCIAL HISTORY:    [x ] Non-smoker  [ ] Smoker  [x ] No Alcohol     REVIEW OF SYSTEMS:  CONSTITUTIONAL: No fever, weight loss, or fatigue  CARDIOLOGY: PAtient denies chest pain,    RESPIRATORY: denies shortness of breath, wheezeing.   NEUROLOGICAL: NO weakness, no focal deficits to report.  ENDOCRINOLOGICAL: no recent change in diabetic medications.   GI: no BRBPR, no N,V,diarrhea.    PSYCHIATRY: normal mood and affect  HEENT: no nasal discharge, no ecchymosis  SKIN: no ecchymosis, no breakdown  MUSCULOSKELETAL: Full range of motion x4.      PHYSICAL EXAM:  T(C): 36.2 (01-25-19 @ 23:32), Max: 36.2 (01-25-19 @ 23:32)  HR: 55 (01-25-19 @ 23:32) (40 - 64)  BP: 108/57 (01-25-19 @ 23:32) (97/57 - 108/57)  RR: 18 (01-25-19 @ 23:32) (15 - 20)  SpO2: 100% (01-25-19 @ 23:32) (95% - 100%)  Wt(kg): --  I&O's Summary    25 Jan 2019 07:01  -  26 Jan 2019 01:35  --------------------------------------------------------  IN: 0 mL / OUT: 1000 mL / NET: -1000 mL      Daily     Daily     General Appearance: Normal	  Cardiovascular: Normal S1 S2, systolic murmur   Respiratory: decrease basal airways entries   Psychiatry: A & O x 3,   Gastrointestinal:  Soft, Non-tender  Skin: No rashes, No ecchymoses, No cyanosis	  Neurologic: Non-focal  Extremities: TYE +1   Vascular: Peripheral pulses palpable         LABS:	 	                        11.5   6.53  )-----------( 151      ( 25 Jan 2019 13:43 )             35.6     01-25    137  |  84<L>  |  94<HH>  ----------------------------<  199<H>  2.7<LL>   |  39<H>  |  3.3<H>    Ca    9.1      25 Jan 2019 13:43  Mg     3.2     01-25    TPro  7.1  /  Alb  3.8  /  TBili  1.5<H>  /  DBili  x   /  AST  34  /  ALT  30  /  AlkPhos  88  01-25          CARDIAC MARKERS:  Troponin T, Serum: 0.07 ng/mL (01-25 @ 13:43)            TELEMETRY EVENTS:  sinus alessandra 	    ECG:  	< from: 12 Lead ECG (01.25.19 @ 16:09) >  Sinus bradycardia with A-V dissociation and Junctional bradycardia with   Sinus/atrial capture  Left axis deviation  Left anterior fascicular block  Cannot rule out Inferior infarct , age undetermined  Abnormal ECG    < end of copied text >    RADIOLOGY:  < from: CT Chest w/ IV Cont (01.25.19 @ 14:58) >  IMPRESSION:    1. No CT evidence of acute traumatic intrathoracic or intra-abdominal   pathology.    2.  Mediastinal and lower paraesophageal lymphadenopathy.    3. Indeterminate lucent lesion within the T7 vertebral body. Nonemergent   thoracic spine MRI may be obtained for further evaluation.    < end of copied text > HISTORY OF PRESENT ILLNESS:     67 yo M with PMHx of AS s/p TAVR, AF s/p RFA both around 9 months ago, HTN, HLD, and DM, asthma/COPD, brought in by EMS after being found unresponsive on floor with unwitnessed fall, He didn't remember the event, he lost his consciousness suddenly, denies previous symptoms of chest sob or palpitation. found by EMS unresponsive, with urine and fecal incontinence. In ED patient found bradycardic with significant sinus bradycardia. Patient mentioned he had another syncope episode around 2 month ago, he had was driving , felt dizzy, he has to pull over then he lost his consciousness and didnt remember what happen. Patient is aware about low heart rate with possible need of PPM in the future. In review of his medication he is on metoprolol prn didnt remember when last time he took it.     Patient was on bumetanide and it was stopped by his nephrologist for dehydration and josé luis during the last week.    In ED labs showed significant hypokalemia with K = 2.6     Patient seen at bedside, denies active chest pain or palpitations. He complains of mild dyspnea on exertion for the last few months.       PAST MEDICAL & SURGICAL HISTORY:  HLD (hyperlipidemia)  HTN (hypertension)  Diabetes  H/O prior ablation treatment  S/P TAVR (transcatheter aortic valve replacement)    FAMILY HISTORY:  Family history of hypertension (Father)  No pertinent family history of premature CAD in first degree relatives    SOCIAL HISTORY: Denies EtOH, smoking or drug use    Allergies  codeine (Unknown)    	  Home Medications:  amiodarone 200 mg oral tablet: 1 tab(s) orally once a day (25 Jan 2019 22:14)  atorvastatin 40 mg oral tablet: 1 tab(s) orally once a day (25 Jan 2019 22:14)  digoxin 250 mcg (0.25 mg) oral tablet: 1 tab(s) orally once a day (25 Jan 2019 22:14)  metoprolol succinate 100 mg oral tablet, extended release: 1 tab(s) orally once a day (25 Jan 2019 22:14)  montelukast 10 mg oral tablet: 1 tab(s) orally once a day (25 Jan 2019 22:14)  Symbicort 160 mcg-4.5 mcg/inh inhalation aerosol: 2 puff(s) inhaled 2 times a day (25 Jan 2019 22:14)  tamsulosin 0.4 mg oral capsule: 1 cap(s) orally once a day (25 Jan 2019 22:14)    MEDICATIONS  (STANDING):  atorvastatin 40 milliGRAM(s) Oral at bedtime  buDESOnide 160 MICROgram(s)/formoterol 4.5 MICROgram(s) Inhaler 2 Puff(s) Inhalation two times a day  dextrose 5%. 1000 milliLiter(s) (50 mL/Hr) IV Continuous <Continuous>  dextrose 50% Injectable 12.5 Gram(s) IV Push once  dextrose 50% Injectable 25 Gram(s) IV Push once  dextrose 50% Injectable 25 Gram(s) IV Push once  heparin  Injectable 5000 Unit(s) SubCutaneous every 8 hours  insulin glargine Injectable (LANTUS) 20 Unit(s) SubCutaneous at bedtime  insulin lispro Injectable (HumaLOG) 7 Unit(s) SubCutaneous before breakfast  insulin lispro Injectable (HumaLOG) 7 Unit(s) SubCutaneous before lunch  insulin lispro Injectable (HumaLOG) 7 Unit(s) SubCutaneous before dinner  montelukast 10 milliGRAM(s) Oral daily  tamsulosin 0.4 milliGRAM(s) Oral at bedtime    MEDICATIONS  (PRN):  dextrose 40% Gel 15 Gram(s) Oral once PRN Blood Glucose LESS THAN 70 milliGRAM(s)/deciliter  glucagon  Injectable 1 milliGRAM(s) IntraMuscular once PRN Glucose LESS THAN 70 milligrams/deciliter      REVIEW OF SYSTEMS:  CONSTITUTIONAL: No fever, weight loss, fatigue  NECK: No pain or stiffness  RESPIRATORY: No cough, wheezing, +shortness of breath  CARDIOVASCULAR: See HPI  GASTROINTESTINAL: No abdominal/epigastric pain, nausea, vomiting, hematemesis, diarrhea, constipation, melena or hematochezia  GENITOURINARY: No dysuria, frequency, hematuria, incontinence  NEUROLOGICAL: See HPI  SKIN: No itching, burning, rashes, lesions   ENDOCRINE: No heat/cold intolerance or hair loss  MUSCULOSKELETAL: No joint pain or swelling  HEME/LYMPH: No easy bruising or bleeding gums    PHYSICAL EXAM:  T(C): 36.2 (01-25-19 @ 23:32), Max: 36.2 (01-25-19 @ 23:32)  HR: 55 (01-25-19 @ 23:32) (40 - 64)  BP: 108/57 (01-25-19 @ 23:32) (97/57 - 108/57)  RR: 18 (01-25-19 @ 23:32) (15 - 20)  SpO2: 100% (01-25-19 @ 23:32) (95% - 100%)  Wt(kg): --  I&O's Summary    25 Jan 2019 07:01  -  26 Jan 2019 01:35  --------------------------------------------------------  IN: 0 mL / OUT: 1000 mL / NET: -1000 mL      PHYSICAL EXAM:  General: NAD, AAOx3, Morbidly obese  HEENT: NCAT, EOMI, PERRLA  Neck: supple, no JVD  CV: RRR, S1S2 nl, no murmurs, 1+ edema  Respiratory: distant breath sounds, no wheeze, rales or ronchi	  Abdomen: soft, NT/ND, +BS  Extremities: ROM nl, warm, chronic venous stasis changes  Neuro: Nonfocal       	                        11.5   6.53  )-----------( 151      ( 25 Jan 2019 13:43 )             35.6     01-25    137  |  84<L>  |  94<HH>  ----------------------------<  199<H>  2.7<LL>   |  39<H>  |  3.3<H>    Ca    9.1      25 Jan 2019 13:43  Mg     3.2     01-25    TPro  7.1  /  Alb  3.8  /  TBili  1.5<H>  /  DBili  x   /  AST  34  /  ALT  30  /  AlkPhos  88  01-25        Troponin T, Serum: 0.07 ng/mL (01-25 @ 13:43)      	    ECG:  	< from: 12 Lead ECG (01.25.19 @ 16:09) >  Sinus bradycardia with A-V dissociation and Junctional bradycardia with   Sinus/atrial capture  Left axis deviation  Left anterior fascicular block  Cannot rule out Inferior infarct , age undetermined  Abnormal ECG    < end of copied text >        < from: CT Chest w/ IV Cont (01.25.19 @ 14:58) >  IMPRESSION:    1. No CT evidence of acute traumatic intrathoracic or intra-abdominal   pathology.    2.  Mediastinal and lower paraesophageal lymphadenopathy.    3. Indeterminate lucent lesion within the T7 vertebral body. Nonemergent   thoracic spine MRI may be obtained for further evaluation.    < end of copied text >

## 2019-01-26 NOTE — CONSULT NOTE ADULT - SUBJECTIVE AND OBJECTIVE BOX
HISTORY OF PRESENT ILLNESS:     This is a 68 years old male patient with  PMHx of AS s/p TAVR,atrial fibrillation S/P ablation both around 9 months ago, HTN. HLD, and DM, asthma/COPD,  brought in by EMS after being found unresponsive on floor with unwitnessed fall, He didnt remember the event , he lost his consciousness suddenly, denies previous symptoms of chest sob or palpitation. found by EMS unresponsive, with urine and fecal incontinence. In ED patient found bradycardic with significant sinus bradycardia. Patient mentioned he had another syncope episode around 2 month ago, he had was driving , felt dizzy, he has to pull over then he lost his consciousness and didnt remember what happen. Patient is aware about low heart rate with possible need of PPM in the future. In review of his medication he is on metoprolol prn didnt remember when last time he took it.   To note , patient was on bumetanide  and it was stopped by his nephrologist for dehydration and josé luis during the last week.   In ED labs showed significant hypokalemia with K = 2.6   patient seen at bedside, denies active chest pain or palpitations. He complains of mild dyspnea on exertion for the last few months.     PAST MEDICAL & SURGICAL HISTORY:  HLD (hyperlipidemia)  HTN (hypertension)  Diabetes  H/O prior ablation treatment  S/P TAVR (transcatheter aortic valve replacement)    FAMILY HISTORY:  Family history of hypertension (Father)    Allergies    codeine (Unknown)    Intolerances    	  Home Medications:  amiodarone 200 mg oral tablet: 1 tab(s) orally once a day (25 Jan 2019 22:14)  atorvastatin 40 mg oral tablet: 1 tab(s) orally once a day (25 Jan 2019 22:14)  digoxin 250 mcg (0.25 mg) oral tablet: 1 tab(s) orally once a day (25 Jan 2019 22:14)  metoprolol succinate 100 mg oral tablet, extended release: 1 tab(s) orally once a day (25 Jan 2019 22:14)  montelukast 10 mg oral tablet: 1 tab(s) orally once a day (25 Jan 2019 22:14)  Symbicort 160 mcg-4.5 mcg/inh inhalation aerosol: 2 puff(s) inhaled 2 times a day (25 Jan 2019 22:14)  tamsulosin 0.4 mg oral capsule: 1 cap(s) orally once a day (25 Jan 2019 22:14)    MEDICATIONS  (STANDING):  atorvastatin 40 milliGRAM(s) Oral at bedtime  buDESOnide 160 MICROgram(s)/formoterol 4.5 MICROgram(s) Inhaler 2 Puff(s) Inhalation two times a day  dextrose 5%. 1000 milliLiter(s) (50 mL/Hr) IV Continuous <Continuous>  dextrose 50% Injectable 12.5 Gram(s) IV Push once  dextrose 50% Injectable 25 Gram(s) IV Push once  dextrose 50% Injectable 25 Gram(s) IV Push once  heparin  Injectable 5000 Unit(s) SubCutaneous every 8 hours  insulin glargine Injectable (LANTUS) 20 Unit(s) SubCutaneous at bedtime  insulin lispro Injectable (HumaLOG) 7 Unit(s) SubCutaneous before breakfast  insulin lispro Injectable (HumaLOG) 7 Unit(s) SubCutaneous before lunch  insulin lispro Injectable (HumaLOG) 7 Unit(s) SubCutaneous before dinner  montelukast 10 milliGRAM(s) Oral daily  tamsulosin 0.4 milliGRAM(s) Oral at bedtime    MEDICATIONS  (PRN):  dextrose 40% Gel 15 Gram(s) Oral once PRN Blood Glucose LESS THAN 70 milliGRAM(s)/deciliter  glucagon  Injectable 1 milliGRAM(s) IntraMuscular once PRN Glucose LESS THAN 70 milligrams/deciliter        SOCIAL HISTORY:    [x ] Non-smoker  [ ] Smoker  [x ] No Alcohol     REVIEW OF SYSTEMS:  CONSTITUTIONAL: No fever, weight loss, or fatigue  CARDIOLOGY: PAtient denies chest pain,    RESPIRATORY: denies shortness of breath, wheezeing.   NEUROLOGICAL: NO weakness, no focal deficits to report.  ENDOCRINOLOGICAL: no recent change in diabetic medications.   GI: no BRBPR, no N,V,diarrhea.    PSYCHIATRY: normal mood and affect  HEENT: no nasal discharge, no ecchymosis  SKIN: no ecchymosis, no breakdown  MUSCULOSKELETAL: Full range of motion x4.      PHYSICAL EXAM:  T(C): 36.2 (01-25-19 @ 23:32), Max: 36.2 (01-25-19 @ 23:32)  HR: 55 (01-25-19 @ 23:32) (40 - 64)  BP: 108/57 (01-25-19 @ 23:32) (97/57 - 108/57)  RR: 18 (01-25-19 @ 23:32) (15 - 20)  SpO2: 100% (01-25-19 @ 23:32) (95% - 100%)  Wt(kg): --  I&O's Summary    25 Jan 2019 07:01  -  26 Jan 2019 01:35  --------------------------------------------------------  IN: 0 mL / OUT: 1000 mL / NET: -1000 mL      Daily     Daily     General Appearance: Normal	  Cardiovascular: Normal S1 S2, systolic murmur   Respiratory: decrease basal airways entries   Psychiatry: A & O x 3,   Gastrointestinal:  Soft, Non-tender  Skin: No rashes, No ecchymoses, No cyanosis	  Neurologic: Non-focal  Extremities: TYE +1   Vascular: Peripheral pulses palpable         LABS:	 	                        11.5   6.53  )-----------( 151      ( 25 Jan 2019 13:43 )             35.6     01-25    137  |  84<L>  |  94<HH>  ----------------------------<  199<H>  2.7<LL>   |  39<H>  |  3.3<H>    Ca    9.1      25 Jan 2019 13:43  Mg     3.2     01-25    TPro  7.1  /  Alb  3.8  /  TBili  1.5<H>  /  DBili  x   /  AST  34  /  ALT  30  /  AlkPhos  88  01-25          CARDIAC MARKERS:  Troponin T, Serum: 0.07 ng/mL (01-25 @ 13:43)            TELEMETRY EVENTS:  sinus alessandra 	    ECG:  	< from: 12 Lead ECG (01.25.19 @ 16:09) >  Sinus bradycardia with A-V dissociation and Junctional bradycardia with   Sinus/atrial capture  Left axis deviation  Left anterior fascicular block  Cannot rule out Inferior infarct , age undetermined  Abnormal ECG    < end of copied text >    RADIOLOGY:  < from: CT Chest w/ IV Cont (01.25.19 @ 14:58) >  IMPRESSION:    1. No CT evidence of acute traumatic intrathoracic or intra-abdominal   pathology.    2.  Mediastinal and lower paraesophageal lymphadenopathy.    3. Indeterminate lucent lesion within the T7 vertebral body. Nonemergent   thoracic spine MRI may be obtained for further evaluation.    < end of copied text >

## 2019-01-26 NOTE — CONSULT NOTE ADULT - ASSESSMENT
-Syncope likely related to bradycardia   -AS s/p TAVR few months ago , NYHA III currently   -hypokalemia   -elevated creatinine likely josé luis on ckd   -Hx of afib s/p ablation currently in sinus alessandra, CHADSVASC = 3 on xarelto     Plan:  -CCU monitoring  -hold all AV johnathan blockers  -Hold amiodarone  -correct electrolytes (hypokalemia)  -monitor kidney function   -2decho   -Check TSH  -Likely will need PPM   -EP evaluation Syncope / Symptomatic Bradycardia  Paroxysmal AF s/p RFA and AtriClip  AS s/p TAVR in May 2018  ANGELINA on CKD  Hypokalemia       Plan:  -CCU monitoring  -Hold all AV johnathan blockers  -Hold amiodarone  -Hold Xarelto  -Replete K  -Check TSH  -Echo  -EP evaluation for PPM

## 2019-01-26 NOTE — CONSULT NOTE ADULT - ATTENDING COMMENTS
No neurosurgical intervention recommended for T-7 lesion that appears to be benign. May undergo MRI of thoracic spine in the future electively for followup.
Discharged before seen.
Sick sinus syndrome with symptoms  Tachy-Joel Syndrome  Paroxysmal Atrial Fibrillation s/p ablation  ANGELINA on CKD  Hypokalemia    Critically ill patient  s/o seizure; neurology follow-up  Recommend stop Amiodarone, metoprolol, digoxin  Check 2D Echo  Check TSH, Free T4  Will likely need a pacemaker implant prior to discharge.  Hold Xarelto for now  Will follow patient with you
Assessment and plan above were modified and discussed with residents, physician assistants, and nurses.

## 2019-01-26 NOTE — CONSULT NOTE ADULT - ASSESSMENT
-Syncope likely related to bradycardia   -AS s/p TAVR few months ago , NYHA III currently   -hypokalemia   -elevated creatinine likely josé luis on ckd   -Hx of afib s/p ablation currently in sinus alessandra, CHADSVASC = 3 on xarelto     Plan:  -CCU monitoring  -hold all AV johnathan blockers  -Hold amiodarone  -correct electrolytes (hypokalemia)  -monitor kidney function   -2decho   -Check TSH  -Likely will need PPM

## 2019-01-26 NOTE — PROGRESS NOTE ADULT - SUBJECTIVE AND OBJECTIVE BOX
PASTOR NOONAN 68y Male  MRN#: [id number]   CODE STATUS: FULL CODE      SUBJECTIVE    Subjective complaints -Lying comfortably in bed.     OBJECTIVE  PAST MEDICAL & SURGICAL HISTORY  HLD (hyperlipidemia)  HTN (hypertension)  Diabetes  H/O prior ablation treatment  S/P TAVR (transcatheter aortic valve replacement)                                            -----------------------------------------------------------  ALLERGIES:  codeine (Unknown)                                            ------------------------------------------------------------  MEDICATIONS:  STANDING MEDICATIONS  atorvastatin 40 milliGRAM(s) Oral at bedtime  buDESOnide 160 MICROgram(s)/formoterol 4.5 MICROgram(s) Inhaler 2 Puff(s) Inhalation two times a day  dextrose 5%. 1000 milliLiter(s) IV Continuous <Continuous>  dextrose 50% Injectable 12.5 Gram(s) IV Push once  dextrose 50% Injectable 25 Gram(s) IV Push once  dextrose 50% Injectable 25 Gram(s) IV Push once  heparin  Injectable 5000 Unit(s) SubCutaneous every 8 hours  insulin glargine Injectable (LANTUS) 20 Unit(s) SubCutaneous at bedtime  insulin lispro (HumaLOG) corrective regimen sliding scale   SubCutaneous three times a day before meals  insulin lispro Injectable (HumaLOG) 3 Unit(s) SubCutaneous once  insulin lispro Injectable (HumaLOG) 7 Unit(s) SubCutaneous three times a day before meals  montelukast 10 milliGRAM(s) Oral daily  tamsulosin 0.4 milliGRAM(s) Oral at bedtime    PRN MEDICATIONS  dextrose 40% Gel 15 Gram(s) Oral once PRN  glucagon  Injectable 1 milliGRAM(s) IntraMuscular once PRN                                            ------------------------------------------------------------  VITAL SIGNS: Last 24 Hours  T(C): 36.3 (2019 12:00), Max: 36.7 (2019 07:29)  T(F): 97.3 (2019 12:00), Max: 98 (2019 07:29)  HR: 50 (2019 12:00) (40 - 64)  BP: 105/61 (2019 12:) (97/57 - 111/66)  BP(mean): --  RR: 19 (2019 12:) (14 - 20)  SpO2: 97% (2019 12:00) (95% - 100%)      19 @ 07:01  -  19 @ 07:00  --------------------------------------------------------  IN: 0 mL / OUT: 1575 mL / NET: -1575 mL    19 @ 07:  -  19 @ 12:55  --------------------------------------------------------  IN: 450 mL / OUT: 250 mL / NET: 200 mL                                             --------------------------------------------------------------  LABS:                        11.4   7.45  )-----------( 165      ( 2019 10:39 )             36.2         136  |  85<L>  |  82<HH>  ----------------------------<  161<H>  3.5   |  40<H>  |  2.8<H>    Ca    8.8      2019 10:39  Mg     3.2         TPro  7.3  /  Alb  3.6  /  TBili  1.5<H>  /  DBili  x   /  AST  31  /  ALT  28  /  AlkPhos  88      PT/INR - ( 2019 13:43 )   PT: 14.60 sec;   INR: 1.27 ratio         PTT - ( 2019 13:43 )  PTT:33.8 sec  Urinalysis Basic - ( 2019 17:00 )    Color: Yellow / Appearance: Clear / S.020 / pH: x  Gluc: x / Ketone: Negative  / Bili: Negative / Urobili: 1.0 mg/dL   Blood: x / Protein: Negative mg/dL / Nitrite: Negative   Leuk Esterase: Negative / RBC: x / WBC x   Sq Epi: x / Non Sq Epi: x / Bacteria: x    Troponin T, Serum: 0.06 ng/mL <HH> (19 @ 00:41)  Lactate, Blood: 2.5 mmol/L <H> (19 @ 13:43)  Troponin T, Serum: 0.07 ng/mL <HH> (19 @ 13:43)  Creatine Kinase, Serum: 292 U/L <H> (19 @ 13:43)    CARDIAC MARKERS ( 2019 00:41 )  x     / 0.06 ng/mL / x     / x     / x      CARDIAC MARKERS ( 2019 13:43 )  x     / 0.07 ng/mL / 292 U/L / x     / x                                                  -------------------------------------------------------------  RADIOLOGY:  < from: Xray Pelvis AP only (19 @ 14:30) >    No evidence of obvious acute fracture or dislocation. The right femur   neck is limited for evaluation secondary to rotation.    < from: Xray Chest 1 View AP/PA (19 @ 14:29) >    No radiographic evidence of acute cardiopulmonary disease.    < from: CT Chest, Abd/plevis w/ IV Cont (19 @ 14:58) >  IMPRESSION:    1. No CT evidence of acute traumatic intrathoracic or intra-abdominal   pathology.    2.  Mediastinal and lower paraesophageal lymphadenopathy.    3. Indeterminate lucent lesion within the T7 vertebral body. Nonemergent   thoracic spine MRI may be obtained for further evaluation.     < from: CT Cervical Spine No Cont (19 @ 14:51) >    1.  Degenerative changes of the cervical spine C2-3 through C7-T1 as   described above.    2.  Straightening ofnormal cervical lordosis with mild anterolisthesis   of C3 on C4, C4 on C5 and retrolisthesis of C5 posterior on C6.     3.  No acute fractures or dislocations                                            --------------------------------------------------------------    PHYSICAL EXAM:  General Appearance: Normal	  Cardiovascular: Normal S1 S2, systolic murmur   Respiratory: decrease basal airways entries   Psychiatry: A & O x 3,   Gastrointestinal:  Soft, Non-tender  Skin: No rashes, No ecchymoses, No cyanosis	  Neurologic: Non-focal  Extremities: TYE +1   Vascular: Peripheral pulses palpable                                           --------------------------------------------------------------    ASSESSMENT & PLAN    Syncope likely 2/2 bradycardia  AS s/p TAVR few months ago, NYHA III currently  Hypokalemia  ANGELINA vs prgression of CKD (unknown baseline)  Afib s/p ablation , Chadvasc 3 on xarelto (on hold currently)  DM  BPH  DLD  COPD    #Syncope/symptomatic bradycardia  -Could be 2/2 hypokalemia as predisposing factor vs medication induced.   -Continue holding amiodarone, metoprolol, digoxin    -F/U digoxin level  -CE 0.07-->0.06; continue to trend  -F/U TTE  -F/U TSH levels  -Likely PPM implantation on monday- Hold heparin sq after tomorrow morning dose.     # ANGELINA  versus worsening CKD ( unknown baseline)  -Gentle hydration, patient received bolus in ED ( CAUTION GIVEN CARDIOMEGALY ON xray)  -f/U US renal  -Monitor BMP; Renal c/s if worsening    #Hypokalemia in context of ANGELINA  -maybe secondary to previous overdiuresis  -keep K around 4 , Mg around 2; replete prn    #Recurrent a fib s/p ablation: CHADSVASC 3   -will hold Xarelto for now, patient will mostly need pacemaker  -Hold AVN blocking meds    #Diabetes- patient is on insulin ( does not know the dosage); check FS, basal /bolus insulin if needed  #BPH: on tamsulosin  #Dyslipidemia:  on statin   #COPD- continue with Symbicort / inhalers     #DVT prophylaxis Heparin sq  #GI prophylaxis Not indicated  #Diet Consistent carb w/o snacks  #Activity IAT  #Code status FULL  #Disposition c/w CCU    *Called zoltan Benoit at [phone] on  again- no reply, left a voice message for home meds confirmation.                                                                            -------------------------------------------------------- PASTOR NOONAN 68y Male  MRN#: [id number]   CODE STATUS: FULL CODE      SUBJECTIVE    Subjective complaints -Lying comfortably in bed.     OBJECTIVE  PAST MEDICAL & SURGICAL HISTORY  HLD (hyperlipidemia)  HTN (hypertension)  Diabetes  H/O prior ablation treatment  S/P TAVR (transcatheter aortic valve replacement)                                            -----------------------------------------------------------  ALLERGIES:  codeine (Unknown)                                            ------------------------------------------------------------  MEDICATIONS:  STANDING MEDICATIONS  atorvastatin 40 milliGRAM(s) Oral at bedtime  buDESOnide 160 MICROgram(s)/formoterol 4.5 MICROgram(s) Inhaler 2 Puff(s) Inhalation two times a day  dextrose 5%. 1000 milliLiter(s) IV Continuous <Continuous>  dextrose 50% Injectable 12.5 Gram(s) IV Push once  dextrose 50% Injectable 25 Gram(s) IV Push once  dextrose 50% Injectable 25 Gram(s) IV Push once  heparin  Injectable 5000 Unit(s) SubCutaneous every 8 hours  insulin glargine Injectable (LANTUS) 20 Unit(s) SubCutaneous at bedtime  insulin lispro (HumaLOG) corrective regimen sliding scale   SubCutaneous three times a day before meals  insulin lispro Injectable (HumaLOG) 3 Unit(s) SubCutaneous once  insulin lispro Injectable (HumaLOG) 7 Unit(s) SubCutaneous three times a day before meals  montelukast 10 milliGRAM(s) Oral daily  tamsulosin 0.4 milliGRAM(s) Oral at bedtime    PRN MEDICATIONS  dextrose 40% Gel 15 Gram(s) Oral once PRN  glucagon  Injectable 1 milliGRAM(s) IntraMuscular once PRN                                            ------------------------------------------------------------  VITAL SIGNS: Last 24 Hours  T(C): 36.3 (2019 12:00), Max: 36.7 (2019 07:29)  T(F): 97.3 (2019 12:00), Max: 98 (2019 07:29)  HR: 50 (2019 12:00) (40 - 64)  BP: 105/61 (2019 12:) (97/57 - 111/66)  BP(mean): --  RR: 19 (2019 12:) (14 - 20)  SpO2: 97% (2019 12:00) (95% - 100%)      19 @ 07:01  -  19 @ 07:00  --------------------------------------------------------  IN: 0 mL / OUT: 1575 mL / NET: -1575 mL    19 @ 07:  -  19 @ 12:55  --------------------------------------------------------  IN: 450 mL / OUT: 250 mL / NET: 200 mL                                             --------------------------------------------------------------  LABS:                        11.4   7.45  )-----------( 165      ( 2019 10:39 )             36.2         136  |  85<L>  |  82<HH>  ----------------------------<  161<H>  3.5   |  40<H>  |  2.8<H>    Ca    8.8      2019 10:39  Mg     3.2         TPro  7.3  /  Alb  3.6  /  TBili  1.5<H>  /  DBili  x   /  AST  31  /  ALT  28  /  AlkPhos  88      PT/INR - ( 2019 13:43 )   PT: 14.60 sec;   INR: 1.27 ratio         PTT - ( 2019 13:43 )  PTT:33.8 sec  Urinalysis Basic - ( 2019 17:00 )    Color: Yellow / Appearance: Clear / S.020 / pH: x  Gluc: x / Ketone: Negative  / Bili: Negative / Urobili: 1.0 mg/dL   Blood: x / Protein: Negative mg/dL / Nitrite: Negative   Leuk Esterase: Negative / RBC: x / WBC x   Sq Epi: x / Non Sq Epi: x / Bacteria: x    Troponin T, Serum: 0.06 ng/mL <HH> (19 @ 00:41)  Lactate, Blood: 2.5 mmol/L <H> (19 @ 13:43)  Troponin T, Serum: 0.07 ng/mL <HH> (19 @ 13:43)  Creatine Kinase, Serum: 292 U/L <H> (19 @ 13:43)    CARDIAC MARKERS ( 2019 00:41 )  x     / 0.06 ng/mL / x     / x     / x      CARDIAC MARKERS ( 2019 13:43 )  x     / 0.07 ng/mL / 292 U/L / x     / x                                                  -------------------------------------------------------------  RADIOLOGY:  < from: Xray Pelvis AP only (19 @ 14:30) >    No evidence of obvious acute fracture or dislocation. The right femur   neck is limited for evaluation secondary to rotation.    < from: Xray Chest 1 View AP/PA (19 @ 14:29) >    No radiographic evidence of acute cardiopulmonary disease.    < from: CT Chest, Abd/plevis w/ IV Cont (19 @ 14:58) >  IMPRESSION:    1. No CT evidence of acute traumatic intrathoracic or intra-abdominal   pathology.    2.  Mediastinal and lower paraesophageal lymphadenopathy.    3. Indeterminate lucent lesion within the T7 vertebral body. Nonemergent   thoracic spine MRI may be obtained for further evaluation.     < from: CT Cervical Spine No Cont (19 @ 14:51) >    1.  Degenerative changes of the cervical spine C2-3 through C7-T1 as   described above.    2.  Straightening ofnormal cervical lordosis with mild anterolisthesis   of C3 on C4, C4 on C5 and retrolisthesis of C5 posterior on C6.     3.  No acute fractures or dislocations                                            --------------------------------------------------------------    PHYSICAL EXAM:  General Appearance: Normal	  Cardiovascular: Normal S1 S2, systolic murmur   Respiratory: decrease basal airways entries   Psychiatry: A & O x 3,   Gastrointestinal:  Soft, Non-tender  Skin: No rashes, No ecchymoses, No cyanosis	  Neurologic: Non-focal  Extremities: TYE +1   Vascular: Peripheral pulses palpable                                           --------------------------------------------------------------    ASSESSMENT & PLAN  Sick sinus syndrome with symptoms  Tachy-Joel Syndrome  Syncope likely 2/2 bradycardia  AS s/p TAVR few months ago, NYHA III currently  Hypokalemia  ANGELINA vs prgression of CKD (unknown baseline)  Afib s/p ablation , Chadvasc 3 on xarelto (on hold currently)  DM  BPH  DLD  COPD    #Syncope/symptomatic bradycardia  -Could be 2/2 hypokalemia as predisposing factor vs medication induced.   -Continue holding amiodarone, metoprolol, digoxin    -F/U digoxin level  -CE 0.07-->0.06; continue to trend  -F/U TTE  -F/U TSH levels  -Likely PPM implantation on monday- Hold heparin sq after tomorrow morning dose.     # ANGELINA  versus worsening CKD ( unknown baseline)  -Gentle hydration, patient received bolus in ED ( CAUTION GIVEN CARDIOMEGALY ON xray)  -f/U US renal  -Monitor BMP; Renal c/s if worsening    #Hypokalemia in context of ANGELINA  -maybe secondary to previous overdiuresis  -keep K around 4 , Mg around 2; replete prn    #Recurrent a fib s/p ablation: CHADSVASC 3   -will hold Xarelto for now, patient will mostly need pacemaker  -Hold AVN blocking meds    #Diabetes- patient is on insulin ( does not know the dosage); check FS, basal /bolus insulin if needed  #BPH: on tamsulosin  #Dyslipidemia:  on statin   #COPD- continue with Symbicort / inhalers     #DVT prophylaxis Heparin sq  #GI prophylaxis Not indicated  #Diet Consistent carb w/o snacks  #Activity IAT  #Code status FULL  #Disposition c/w CCU    *Called zoltan Benoit at [phone] on  again- no reply, left a voice message for home meds confirmation.                                                                            --------------------------------------------------------

## 2019-01-26 NOTE — CHART NOTE - NSCHARTNOTEFT_GEN_A_CORE
This is Attending statement, I couldn't add to the H&P:     68 year old M PMH HTN, DM type 2, CHF, BPH, asthma/COPD, atrial fibrillation S/P ablation 9 months ago, valvular disorder S/P TAVR 9 months ago brought in by EMS after syncopal episode. Patient was driving his new car to his doctor office, he felt dizzy before arrival to his office, he got out of his car and walked few steps then he collapsed, he doesn't recall what happened, the office staff heard the fall and found the patient on the floor, he was with urine and fecal incontinence, EMS was called, when arrived patient was awake but confused. Patient found in the ED with bradycardia HR 40s, Patient  denies any chest pain, palpitation or SOB. denied, nausea, vomiting, abdominal pain.  Patient was very confused on how the fall happened.  Patient was trauma alert in ED . In Ed patient was found in sinus bradycardia , and was given atropin in ED.    PHYSICAL EXAM:  GENERAL: NAD, well-developed  HEAD:  Atraumatic, Normocephalic  EYES: EOMI, PERRLA, conjunctiva and sclera clear  NECK: Supple, No JVD  CHEST/LUNG: Clear to auscultation bilaterally; No wheeze  HEART: Regular rate and rhythm; S1 S2 Bradycardic.   ABDOMEN: Soft, Nontender, Nondistended; Bowel sounds present  EXTREMITIES: Chronic Lower extremities hyperpigmentation from venous stasis changes, pitting edema 2+, pulses are ok b/l.   PSYCH: AAOx3  NEUROLOGY: non-focal    A/P:     Syncopal Episode: likely from Bradycardia, sick sinus syndrome.   head CT was negative.   Trauma work up showed no acute fracture.   Pending echo.     Bradycardia and Sick sinus syndrome:   Hx of Afib s/p ablation, no sinus bradycardia.   EP consult  patient may need pacemaker.   Hold all AV johnathan blockage agent, metoprolol, digoxin and amiodarone.   Hold Eliquis.    severe Hypokalemia:   likely from diuretics.   replaced. Monitor K level.     ANGELINA on CKD:   likely from pre-renal  Hold diuretics.   Monitor BMP  Check UA and urine lytes.     DM type 2:   Start on Lantus, Humalog sliding scale.   diabetic diet.

## 2019-01-27 LAB
ANION GAP SERPL CALC-SCNC: 10 MMOL/L — SIGNIFICANT CHANGE UP (ref 7–14)
ANION GAP SERPL CALC-SCNC: 13 MMOL/L — SIGNIFICANT CHANGE UP (ref 7–14)
BASOPHILS # BLD AUTO: 0.04 K/UL — SIGNIFICANT CHANGE UP (ref 0–0.2)
BASOPHILS NFR BLD AUTO: 0.5 % — SIGNIFICANT CHANGE UP (ref 0–1)
BUN SERPL-MCNC: 62 MG/DL — CRITICAL HIGH (ref 10–20)
BUN SERPL-MCNC: 74 MG/DL — CRITICAL HIGH (ref 10–20)
CALCIUM SERPL-MCNC: 8.8 MG/DL — SIGNIFICANT CHANGE UP (ref 8.5–10.1)
CALCIUM SERPL-MCNC: 9.2 MG/DL — SIGNIFICANT CHANGE UP (ref 8.5–10.1)
CHLORIDE SERPL-SCNC: 88 MMOL/L — LOW (ref 98–110)
CHLORIDE SERPL-SCNC: 89 MMOL/L — LOW (ref 98–110)
CK MB CFR SERPL CALC: 2.5 NG/ML — SIGNIFICANT CHANGE UP (ref 0.6–6.3)
CK SERPL-CCNC: 171 U/L — SIGNIFICANT CHANGE UP (ref 0–225)
CO2 SERPL-SCNC: 36 MMOL/L — HIGH (ref 17–32)
CO2 SERPL-SCNC: 37 MMOL/L — HIGH (ref 17–32)
CREAT ?TM UR-MCNC: 95 MG/DL — SIGNIFICANT CHANGE UP
CREAT SERPL-MCNC: 2.1 MG/DL — HIGH (ref 0.7–1.5)
CREAT SERPL-MCNC: 2.5 MG/DL — HIGH (ref 0.7–1.5)
EOSINOPHIL # BLD AUTO: 0.11 K/UL — SIGNIFICANT CHANGE UP (ref 0–0.7)
EOSINOPHIL NFR BLD AUTO: 1.3 % — SIGNIFICANT CHANGE UP (ref 0–8)
GLUCOSE BLDC GLUCOMTR-MCNC: 125 MG/DL — HIGH (ref 70–99)
GLUCOSE BLDC GLUCOMTR-MCNC: 126 MG/DL — HIGH (ref 70–99)
GLUCOSE BLDC GLUCOMTR-MCNC: 171 MG/DL — HIGH (ref 70–99)
GLUCOSE BLDC GLUCOMTR-MCNC: 177 MG/DL — HIGH (ref 70–99)
GLUCOSE BLDC GLUCOMTR-MCNC: 183 MG/DL — HIGH (ref 70–99)
GLUCOSE BLDC GLUCOMTR-MCNC: 256 MG/DL — HIGH (ref 70–99)
GLUCOSE SERPL-MCNC: 112 MG/DL — HIGH (ref 70–99)
GLUCOSE SERPL-MCNC: 172 MG/DL — HIGH (ref 70–99)
HCT VFR BLD CALC: 34.8 % — LOW (ref 42–52)
HGB BLD-MCNC: 11.1 G/DL — LOW (ref 14–18)
IMM GRANULOCYTES NFR BLD AUTO: 0.5 % — HIGH (ref 0.1–0.3)
LYMPHOCYTES # BLD AUTO: 0.7 K/UL — LOW (ref 1.2–3.4)
LYMPHOCYTES # BLD AUTO: 8.1 % — LOW (ref 20.5–51.1)
MAGNESIUM SERPL-MCNC: 3.1 MG/DL — CRITICAL HIGH (ref 1.8–2.4)
MAGNESIUM SERPL-MCNC: 3.3 MG/DL — CRITICAL HIGH (ref 1.8–2.4)
MCHC RBC-ENTMCNC: 30.4 PG — SIGNIFICANT CHANGE UP (ref 27–31)
MCHC RBC-ENTMCNC: 31.9 G/DL — LOW (ref 32–37)
MCV RBC AUTO: 95.3 FL — HIGH (ref 80–94)
MONOCYTES # BLD AUTO: 1.02 K/UL — HIGH (ref 0.1–0.6)
MONOCYTES NFR BLD AUTO: 11.8 % — HIGH (ref 1.7–9.3)
NEUTROPHILS # BLD AUTO: 6.72 K/UL — HIGH (ref 1.4–6.5)
NEUTROPHILS NFR BLD AUTO: 77.8 % — HIGH (ref 42.2–75.2)
NRBC # BLD: 0 /100 WBCS — SIGNIFICANT CHANGE UP (ref 0–0)
PHOSPHATE SERPL-MCNC: 3.1 MG/DL — SIGNIFICANT CHANGE UP (ref 2.1–4.9)
PLATELET # BLD AUTO: 170 K/UL — SIGNIFICANT CHANGE UP (ref 130–400)
POTASSIUM SERPL-MCNC: 3.4 MMOL/L — LOW (ref 3.5–5)
POTASSIUM SERPL-MCNC: 3.7 MMOL/L — SIGNIFICANT CHANGE UP (ref 3.5–5)
POTASSIUM SERPL-SCNC: 3.4 MMOL/L — LOW (ref 3.5–5)
POTASSIUM SERPL-SCNC: 3.7 MMOL/L — SIGNIFICANT CHANGE UP (ref 3.5–5)
RBC # BLD: 3.65 M/UL — LOW (ref 4.7–6.1)
RBC # FLD: 17.5 % — HIGH (ref 11.5–14.5)
SODIUM SERPL-SCNC: 135 MMOL/L — SIGNIFICANT CHANGE UP (ref 135–146)
SODIUM SERPL-SCNC: 138 MMOL/L — SIGNIFICANT CHANGE UP (ref 135–146)
SODIUM UR-SCNC: 56 MMOL/L — SIGNIFICANT CHANGE UP
TROPONIN T SERPL-MCNC: 0.04 NG/ML — CRITICAL HIGH
TSH SERPL-MCNC: 1.48 UIU/ML — SIGNIFICANT CHANGE UP (ref 0.27–4.2)
WBC # BLD: 8.63 K/UL — SIGNIFICANT CHANGE UP (ref 4.8–10.8)
WBC # FLD AUTO: 8.63 K/UL — SIGNIFICANT CHANGE UP (ref 4.8–10.8)

## 2019-01-27 RX ORDER — POTASSIUM CHLORIDE 20 MEQ
40 PACKET (EA) ORAL ONCE
Qty: 0 | Refills: 0 | Status: COMPLETED | OUTPATIENT
Start: 2019-01-27 | End: 2019-01-28

## 2019-01-27 RX ORDER — POTASSIUM CHLORIDE 20 MEQ
40 PACKET (EA) ORAL EVERY 4 HOURS
Qty: 0 | Refills: 0 | Status: COMPLETED | OUTPATIENT
Start: 2019-01-27 | End: 2019-01-27

## 2019-01-27 RX ADMIN — Medication 40 MILLIEQUIVALENT(S): at 14:49

## 2019-01-27 RX ADMIN — Medication 3: at 13:01

## 2019-01-27 RX ADMIN — CHLORHEXIDINE GLUCONATE 1 APPLICATION(S): 213 SOLUTION TOPICAL at 05:30

## 2019-01-27 RX ADMIN — Medication 1: at 08:27

## 2019-01-27 RX ADMIN — Medication 8 UNIT(S): at 13:00

## 2019-01-27 RX ADMIN — HEPARIN SODIUM 5000 UNIT(S): 5000 INJECTION INTRAVENOUS; SUBCUTANEOUS at 05:31

## 2019-01-27 RX ADMIN — MONTELUKAST 10 MILLIGRAM(S): 4 TABLET, CHEWABLE ORAL at 13:00

## 2019-01-27 RX ADMIN — BUDESONIDE AND FORMOTEROL FUMARATE DIHYDRATE 2 PUFF(S): 160; 4.5 AEROSOL RESPIRATORY (INHALATION) at 08:32

## 2019-01-27 RX ADMIN — ATORVASTATIN CALCIUM 40 MILLIGRAM(S): 80 TABLET, FILM COATED ORAL at 22:09

## 2019-01-27 RX ADMIN — TAMSULOSIN HYDROCHLORIDE 0.4 MILLIGRAM(S): 0.4 CAPSULE ORAL at 22:09

## 2019-01-27 RX ADMIN — INSULIN GLARGINE 21 UNIT(S): 100 INJECTION, SOLUTION SUBCUTANEOUS at 22:26

## 2019-01-27 RX ADMIN — Medication 8 UNIT(S): at 17:55

## 2019-01-27 RX ADMIN — Medication 40 MILLIEQUIVALENT(S): at 09:44

## 2019-01-27 RX ADMIN — BUDESONIDE AND FORMOTEROL FUMARATE DIHYDRATE 2 PUFF(S): 160; 4.5 AEROSOL RESPIRATORY (INHALATION) at 20:41

## 2019-01-27 RX ADMIN — Medication 8 UNIT(S): at 08:26

## 2019-01-27 NOTE — CONSULT NOTE ADULT - ASSESSMENT
68M with PMH of aortic stenosis s/p TAVR, CHF, CKD, atrial fibrillation s/p ablation, DM, BPH, DLD, and COPD who presented 1/25 w/ syncope found w/ symptomatic bradycardia.     Baseline renal function unknown, he reports hx of CKD at least > yr  May have ANGELINA in setting of acute event, Cr is downtrenidng (not on fluids nor diuretics) though is vol up at some point will need diuretics    Suggest  - Trend Cr, outpt labs would be most helpful  - f/u renal Us/ size and echogenicity will be helpful in ascertaining extent of CKD  - Neg hematuria and albuminuria, unlikely Glomerular process, though send Prot/Cr ratio given his edema (UA checks albumin only)   - Send Urine electrolytes Na, Cr K (given hypoK) to help separate renal vs extrarenal causes   - cautiously replete K 68M with PMH of aortic stenosis s/p TAVR, CHF, CKD, atrial fibrillation s/p ablation, DM, BPH, DLD, and COPD who presented 1/25 w/ syncope found w/ symptomatic bradycardia.     Baseline renal function unknown, he reports hx of CKD at least > yr  May have ANGELINA in setting of acute event, Cr is downtrenidng (not on fluids nor diuretics) though is vol up at some point will need diuretics    Suggest  - Trend Cr, outpt labs would be most helpful  - f/u renal Us/ size and echogenicity will be helpful in ascertaining extent of CKD  - Neg hematuria and albuminuria, unlikely Glomerular process, though send Prot/Cr ratio given his edema (UA checks albumin only)   - Send Urine electrolytes Na, Cr K (given hypoK) to help separate renal vs extrarenal causes   - cautiously replete K in setting of elevated Cr  - check Mg, replete if needed  - At some point will need diuretics, Cr improving can hold for Now

## 2019-01-27 NOTE — PROGRESS NOTE ADULT - SUBJECTIVE AND OBJECTIVE BOX
Telemetry: SB  No events overnight      MEDICATIONS  (STANDING):  atorvastatin 40 milliGRAM(s) Oral at bedtime  buDESOnide 160 MICROgram(s)/formoterol 4.5 MICROgram(s) Inhaler 2 Puff(s) Inhalation two times a day  chlorhexidine 4% Liquid 1 Application(s) Topical <User Schedule>  dextrose 5%. 1000 milliLiter(s) (50 mL/Hr) IV Continuous <Continuous>  dextrose 50% Injectable 12.5 Gram(s) IV Push once  dextrose 50% Injectable 25 Gram(s) IV Push once  dextrose 50% Injectable 25 Gram(s) IV Push once  insulin glargine Injectable (LANTUS) 21 Unit(s) SubCutaneous at bedtime  insulin lispro (HumaLOG) corrective regimen sliding scale   SubCutaneous three times a day before meals  insulin lispro Injectable (HumaLOG) 8 Unit(s) SubCutaneous three times a day before meals  montelukast 10 milliGRAM(s) Oral daily  potassium chloride    Tablet ER 40 milliEquivalent(s) Oral every 4 hours  tamsulosin 0.4 milliGRAM(s) Oral at bedtime      ROS:  CV: No chest pain, shortness of breath or palpitations    PHYSICAL EXAM:  Vital Signs Last 24 Hrs  T(C): 36.5 (27 Jan 2019 04:00), Max: 36.9 (26 Jan 2019 20:00)  T(F): 97.7 (27 Jan 2019 04:00), Max: 98.4 (26 Jan 2019 20:00)  HR: 48 (27 Jan 2019 07:00) (48 - 56)  BP: 115/58 (27 Jan 2019 05:00) (90/45 - 130/66)  BP(mean): --  RR: 12 (27 Jan 2019 07:00) (12 - 32)  SpO2: 97% (27 Jan 2019 07:00) (93% - 99%)    General: NAD, AAOx3  Neck: supple, no JVD  CV: RRR, S1S2 nl, no murmurs   Lungs: CTA bilaterally, no wheeze, rales or rhonchi  Abdomen: soft, NT/ND, +BS  Extremities: ROM nl, no clubbing, cyanosis or edema  Neurologic: Nonfocal                            11.1   8.63  )-----------( 170      ( 27 Jan 2019 04:26 )             34.8         01-27    135  |  88<L>  |  74<HH>  ----------------------------<  172<H>  3.4<L>   |  37<H>  |  2.5<H>    Ca    8.8      27 Jan 2019 04:26  Phos  3.1     01-27  Mg     3.3     01-27    TPro  7.3  /  Alb  3.6  /  TBili  1.5<H>  /  DBili  x   /  AST  31  /  ALT  28  /  AlkPhos  88  01-26 Telemetry: SB, No pauses  No events overnight      MEDICATIONS  (STANDING):  atorvastatin 40 milliGRAM(s) Oral at bedtime  buDESOnide 160 MICROgram(s)/formoterol 4.5 MICROgram(s) Inhaler 2 Puff(s) Inhalation two times a day  chlorhexidine 4% Liquid 1 Application(s) Topical <User Schedule>  dextrose 5%. 1000 milliLiter(s) (50 mL/Hr) IV Continuous <Continuous>  dextrose 50% Injectable 12.5 Gram(s) IV Push once  dextrose 50% Injectable 25 Gram(s) IV Push once  dextrose 50% Injectable 25 Gram(s) IV Push once  insulin glargine Injectable (LANTUS) 21 Unit(s) SubCutaneous at bedtime  insulin lispro (HumaLOG) corrective regimen sliding scale   SubCutaneous three times a day before meals  insulin lispro Injectable (HumaLOG) 8 Unit(s) SubCutaneous three times a day before meals  montelukast 10 milliGRAM(s) Oral daily  potassium chloride    Tablet ER 40 milliEquivalent(s) Oral every 4 hours  tamsulosin 0.4 milliGRAM(s) Oral at bedtime      ROS:  CV: No chest pain, shortness of breath or palpitations    PHYSICAL EXAM:  Vital Signs Last 24 Hrs  T(C): 36.5 (27 Jan 2019 04:00), Max: 36.9 (26 Jan 2019 20:00)  T(F): 97.7 (27 Jan 2019 04:00), Max: 98.4 (26 Jan 2019 20:00)  HR: 48 (27 Jan 2019 07:00) (48 - 56)  BP: 115/58 (27 Jan 2019 05:00) (90/45 - 130/66)  BP(mean): --  RR: 12 (27 Jan 2019 07:00) (12 - 32)  SpO2: 97% (27 Jan 2019 07:00) (93% - 99%)    General: NAD, AAOx3  Neck: supple, no JVD  CV: RRR, S1S2 nl, no murmurs   Lungs: CTA bilaterally, no wheeze, rales or rhonchi  Abdomen: soft, NT/ND, +BS  Extremities: ROM nl, no clubbing, cyanosis or edema  Neurologic: Nonfocal                            11.1   8.63  )-----------( 170      ( 27 Jan 2019 04:26 )             34.8         01-27    135  |  88<L>  |  74<HH>  ----------------------------<  172<H>  3.4<L>   |  37<H>  |  2.5<H>    Ca    8.8      27 Jan 2019 04:26  Phos  3.1     01-27  Mg     3.3     01-27    TPro  7.3  /  Alb  3.6  /  TBili  1.5<H>  /  DBili  x   /  AST  31  /  ALT  28  /  AlkPhos  88  01-26

## 2019-01-27 NOTE — CONSULT NOTE ADULT - SUBJECTIVE AND OBJECTIVE BOX
NEPHROLOGY CONSULTATION NOTE    68 year old M [facility] CKD HTN. HLD, and DM, CHF, BPH, DLD, asthma/COPD, atrial fibrillation S/P ablation 9 months ago, valvular disorder S/P TAVR 9 months ago brought in by EMS  after being found unresponsive on floor with urine and fecal incontinence and unwitnessed fall down 5 steps.  He has no recollection of the fall, last recalls climbing stairs .  He reports  that for the last week he was feeling dizzy and having unsteady gait from dizziness, she checked his pulse and it was low, sometimes reaching 40 , and his blood pressure was intermittently low. He reports another syncope episode around 2 month ago, he had was driving , felt dizzy, he has to pull over then he lost his consciousness and did not remember what happen. Patient  denies any chest pain during the last week, no SOB denied , nausea, vomiting, abdominal pain.  Patient was very confused on how the fall happened.  Patient was trauma alert in ED . In Ed patient was found in sinus bradycardia , and was given atropine in ED.    He is vague regarding hx of CKD he first reports never being told has CKd, then says for over year has been told and saw Nephrologist (Dr. Mcbride in [place]) last week who stopped his bumetanide    planned for PPM tomorrow     presented w/ Cr 3.3 -> 2.5 today   UA w/ neg blood/prot  PAST MEDICAL & SURGICAL HISTORY:  HLD (hyperlipidemia)  HTN (hypertension)  Diabetes  H/O prior ablation treatment  S/P TAVR (transcatheter aortic valve replacement)    Allergies:  codeine (Unknown)    Home Medications Reviewed  Hospital Medications:   MEDICATIONS  (STANDING):  atorvastatin 40 milliGRAM(s) Oral at bedtime  buDESOnide 160 MICROgram(s)/formoterol 4.5 MICROgram(s) Inhaler 2 Puff(s) Inhalation two times a day  chlorhexidine 4% Liquid 1 Application(s) Topical <User Schedule>  dextrose 5%. 1000 milliLiter(s) (50 mL/Hr) IV Continuous <Continuous>  dextrose 50% Injectable 12.5 Gram(s) IV Push once  dextrose 50% Injectable 25 Gram(s) IV Push once  dextrose 50% Injectable 25 Gram(s) IV Push once  insulin glargine Injectable (LANTUS) 21 Unit(s) SubCutaneous at bedtime  insulin lispro (HumaLOG) corrective regimen sliding scale   SubCutaneous three times a day before meals  insulin lispro Injectable (HumaLOG) 8 Unit(s) SubCutaneous three times a day before meals  montelukast 10 milliGRAM(s) Oral daily  potassium chloride    Tablet ER 40 milliEquivalent(s) Oral every 4 hours  tamsulosin 0.4 milliGRAM(s) Oral at bedtime      SOCIAL HISTORY:  Denies ETOH,Smoking,   FAMILY HISTORY:  Family history of hypertension (Father)        REVIEW OF SYSTEMS:  CONSTITUTIONAL: No weakness, fevers or chills  EYES/ENT: No visual changes;  No vertigo or throat pain   NECK: No pain or stiffness  RESPIRATORY: No cough, wheezing, hemoptysis; No shortness of breath  CARDIOVASCULAR: No chest pain or palpitations.  GASTROINTESTINAL: No abdominal or epigastric pain. No nausea, vomiting, or hematemesis; No diarrhea or constipation. No melena or hematochezia.  GENITOURINARY: No dysuria, frequency, foamy urine, urinary urgency, incontinence or hematuria  NEUROLOGICAL: No numbness or weakness  SKIN: No itching, burning, rashes, or lesions   VASCULAR: + bilateral lower extremity edema.   All other review of systems is negative unless indicated above.    VITALS:  T(F): 97 (19 @ 07:30), Max: 98.4 (19 @ 20:00)  HR: 56 (19 @ 09:46)  BP: 103/55 (19 @ 09:46)  RR: 24 (19 @ 09:46)  SpO2: 91% (19 @ 09:46)     @ 07:01  -   @ 07:00  --------------------------------------------------------  IN: 750 mL / OUT: 1700 mL / NET: -950 mL            I&O's Detail    2019 07:  -  2019 07:00  --------------------------------------------------------  IN:    Oral Fluid: 750 mL  Total IN: 750 mL    OUT:    Voided: 1700 mL  Total OUT: 1700 mL    Total NET: -950 mL        Creatine Kinase, Serum: 171 U/L (19 @ 04:26)      PHYSICAL EXAM:  Constitutional: NAD  HEENT: anicteric sclera, oropharynx clear, MMM  Neck: No JVD  Respiratory: CTAB, no wheezes, rales or rhonchi  Cardiovascular: S1, S2, RRR  Gastrointestinal: BS+, soft, NT/ND  Extremities: No cyanosis or clubbing. +++peripheral edema  Neurological: A/O x 3, no focal deficits  Psychiatric: Normal mood, normal affect  : No CVA tenderness. + little.   Skin: No rashes  Vascular Access:    LABS:      135  |  88<L>  |  74<HH>  ----------------------------<  172<H>  3.4<L>   |  37<H>  |  2.5<H>    Ca    8.8      2019 04:26  Phos  3.1       Mg     3.3         TPro  7.3  /  Alb  3.6  /  TBili  1.5<H>  /  DBili      /  AST  31  /  ALT  28  /  AlkPhos  88      Creatinine Trend: 2.5 <--, 2.8 <--, 2.9 <--, 3.3 <--                        11.1   8.63  )-----------( 170      ( 2019 04:26 )             34.8     Urine Studies:  Urinalysis Basic - ( 2019 17:00 )    Color: Yellow / Appearance: Clear / S.020 / pH:   Gluc:  / Ketone: Negative  / Bili: Negative / Urobili: 1.0 mg/dL   Blood:  / Protein: Negative mg/dL / Nitrite: Negative   Leuk Esterase: Negative / RBC:  / WBC    Sq Epi:  / Non Sq Epi:  / Bacteria:                 RADIOLOGY & ADDITIONAL STUDIES:

## 2019-01-27 NOTE — PROGRESS NOTE ADULT - ASSESSMENT
68 year old M PMH HTN, DM type 2, CHF, BPH, asthma/COPD, atrial fibrillation S/P ablation 9 months ago, valvular disorder S/P TAVR 9 months ago brought in by EMS after syncopal episode. Patient was driving his new car to his doctor office, he felt dizzy before arrival to his office, he got out of his car and walked few steps then he collapsed, he doesn't recall what happened, the office staff heard the fall and found the patient on the floor, he was with urine and fecal incontinence, EMS was called, when arrived patient was awake but confused. Patient found in the ED with bradycardia HR 40s, Patient  denies any chest pain, palpitation or SOB. denied, nausea, vomiting, abdominal pain.  Patient was very confused on how the fall happened.  Patient was trauma alert in ED . In Ed patient was found in sinus bradycardia , and was given atropin in ED.      A/P:     Syncopal Episode: likely from Bradycardia, sick sinus syndrome.   head CT was negative.   Trauma work up showed no acute fracture.       Bradycardia and Sick sinus syndrome:   Hx of Afib s/p ablation, no sinus bradycardia.   Echo showed normal LVEF, grade II diastolic dysfunction, TAVR in place, mild to moderate MR, mild to moderate TR, PASP 54 mmHg, moderate Pulm HTN.,   EP consult recommended pacemaker placement.  Possible Tuesday.   Hold all AV johnathan blockage agent, metoprolol, digoxin and amiodarone.   Hold Eliquis.    Hypokalemia:   likely from diuretics.   replaced. Monitor K level.     ANGELINA on CKD:   likely from pre-renal  Cr improved from 3.3 to 2.5  Hold diuretics.   Monitor BMP  Check UA and urine lytes.   May resume Diuretics when renal function is stable.     DM type 2:   Start on Lantus, Humalog sliding scale.   diabetic diet.

## 2019-01-27 NOTE — PROGRESS NOTE ADULT - ASSESSMENT
Syncope / Symptomatic Bradycardia  Paroxysmal AF s/p RFA and AtriClip  AS s/p TAVR in May 2018  ANGELINA on CKD  Hypokalemia       Plan:  - Hold all AV johnathan blockers  - Hold Amiodarone  - Hold Xarelto  - Echo pending  - PPM tentatively planned on Tuesday

## 2019-01-27 NOTE — PROGRESS NOTE ADULT - ASSESSMENT
Jack Sweet is a 68M with PMH of aortic stenosis s/p TAVR, CHF, CKD, atrial fibrillation s/p ablation, DM, BPH, DLD, and COPD who is admitted to CCU with symptomatic bradycardia.     #Syncopal episode 2/2 bradycardia 2/2 sick sinus syndrome   -Patient tentatively scheduled for pacemaker on Monday, NPO after midnight, anticoagulation held  -Hold amiodarone, metoprolol, digoxin  -Trend cardiac enzymes, monitor on telemetry and with daily EKGs  -2D echo ordered, follow up results  -Follow up thyroid function tests   -Generalized and focal slowing on EEG, no seizure activity   -EPS on board (Dr. Mauro)     #ANGELINA on CKD   -Cr 3.3 on admission -> 2.8   -Follow up renal US and urine studies   -Monitor electrolytes, UOP     #Macrocytic Anemia  -Follow up B12 and folate  -Monitor CBC     #Paroxysmal Atrial Fibrillation s/p ablation  -CHADSVASC 3  -Xarelto on hold for pacemaker implantation  -Metoprolol on hold 2/2 bradycardia     #DM  -A1C 7.4  -Adjust insulin PRN to achieve BG < 180   -Monitor FS, carbohydrate consistent diet    #BPH  -Continue tamsulosin     #DLD  -Continue statin     #COPD  -Continue Symbicort and Singulair     #DVT ppx: heparin sub-q, hold after today's dose   #GI prophylaxis: not indicated   #Diet: CC   #Activity: increase as tolerated   FULL CODE   CCU monitoring

## 2019-01-27 NOTE — PROGRESS NOTE ADULT - SUBJECTIVE AND OBJECTIVE BOX
SUBJECTIVE:    Patient is a 68y old Male who presents with a chief complaint of fall, LOC (2019 12:55)    Currently admitted to medicine with the primary diagnosis of Bradycardia     Today is hospital day 2d. No acute overnight events.     PAST MEDICAL & SURGICAL HISTORY  HLD (hyperlipidemia)  HTN (hypertension)  Diabetes  H/O prior ablation treatment  S/P TAVR (transcatheter aortic valve replacement)    SOCIAL HISTORY:  Former smoker  No alcohol or illicit drug use     ALLERGIES:  codeine (Unknown)    MEDICATIONS:  STANDING MEDICATIONS  atorvastatin 40 milliGRAM(s) Oral at bedtime  buDESOnide 160 MICROgram(s)/formoterol 4.5 MICROgram(s) Inhaler 2 Puff(s) Inhalation two times a day  chlorhexidine 4% Liquid 1 Application(s) Topical <User Schedule>  dextrose 5%. 1000 milliLiter(s) IV Continuous <Continuous>  dextrose 50% Injectable 12.5 Gram(s) IV Push once  dextrose 50% Injectable 25 Gram(s) IV Push once  dextrose 50% Injectable 25 Gram(s) IV Push once  heparin  Injectable 5000 Unit(s) SubCutaneous every 8 hours  insulin glargine Injectable (LANTUS) 21 Unit(s) SubCutaneous at bedtime  insulin lispro (HumaLOG) corrective regimen sliding scale   SubCutaneous three times a day before meals  insulin lispro Injectable (HumaLOG) 8 Unit(s) SubCutaneous three times a day before meals  montelukast 10 milliGRAM(s) Oral daily  tamsulosin 0.4 milliGRAM(s) Oral at bedtime    PRN MEDICATIONS  dextrose 40% Gel 15 Gram(s) Oral once PRN  glucagon  Injectable 1 milliGRAM(s) IntraMuscular once PRN    VITALS:   T(F): 98.4  HR: 54  BP: 102/67  RR: 13  SpO2: 94%    LABS:                        11.4   7.45  )-----------( 165      ( 2019 10:39 )             36.2     01-    136  |  85<L>  |  82<HH>  ----------------------------<  161<H>  3.5   |  40<H>  |  2.8<H>    Ca    8.8      2019 10:39  Mg     3.2     -    TPro  7.3  /  Alb  3.6  /  TBili  1.5<H>  /  DBili  x   /  AST  31  /  ALT  28  /  AlkPhos  88      PT/INR - ( 2019 13:43 )   PT: 14.60 sec;   INR: 1.27 ratio      PTT - ( 2019 13:43 )  PTT:33.8 sec  Urinalysis Basic - ( 2019 17:00 )    Color: Yellow / Appearance: Clear / S.020 / pH: x  Gluc: x / Ketone: Negative  / Bili: Negative / Urobili: 1.0 mg/dL   Blood: x / Protein: Negative mg/dL / Nitrite: Negative   Leuk Esterase: Negative / RBC: x / WBC x   Sq Epi: x / Non Sq Epi: x / Bacteria: x    Troponin T, Serum: 0.06 ng/mL <HH> (19 @ 00:41)    CARDIAC MARKERS ( 2019 00:41 )  x     / 0.06 ng/mL / x     / x     / x      CARDIAC MARKERS ( 2019 13:43 )  x     / 0.07 ng/mL / 292 U/L / x     / x        RADIOLOGY:  AM CXR ordered  Echo ordered  Renal US ordered    PHYSICAL EXAM:  General Appearance: Normal	  Cardiovascular: Normal S1 S2, systolic murmur   Respiratory: decrease BS   Psychiatry: A & O x 3,   Gastrointestinal:  Soft, Non-tender  Skin: No rashes, No ecchymoses, No cyanosis	  Neurologic: Non-focal  Extremities: LE edema +1   Vascular: Peripheral pulses palpable SUBJECTIVE:    Patient is a 68y old Male who presents with a chief complaint of fall, LOC (2019 12:55)    Currently admitted to medicine with the primary diagnosis of Bradycardia     Today is hospital day 2d. No acute overnight events.     PAST MEDICAL & SURGICAL HISTORY  HLD (hyperlipidemia)  HTN (hypertension)  Diabetes  H/O prior ablation treatment  S/P TAVR (transcatheter aortic valve replacement)    SOCIAL HISTORY:  Former smoker  No alcohol or illicit drug use     ALLERGIES:  codeine (Unknown)    MEDICATIONS:  STANDING MEDICATIONS  atorvastatin 40 milliGRAM(s) Oral at bedtime  buDESOnide 160 MICROgram(s)/formoterol 4.5 MICROgram(s) Inhaler 2 Puff(s) Inhalation two times a day  chlorhexidine 4% Liquid 1 Application(s) Topical <User Schedule>  dextrose 5%. 1000 milliLiter(s) IV Continuous <Continuous>  dextrose 50% Injectable 12.5 Gram(s) IV Push once  dextrose 50% Injectable 25 Gram(s) IV Push once  dextrose 50% Injectable 25 Gram(s) IV Push once  heparin  Injectable 5000 Unit(s) SubCutaneous every 8 hours  insulin glargine Injectable (LANTUS) 21 Unit(s) SubCutaneous at bedtime  insulin lispro (HumaLOG) corrective regimen sliding scale   SubCutaneous three times a day before meals  insulin lispro Injectable (HumaLOG) 8 Unit(s) SubCutaneous three times a day before meals  montelukast 10 milliGRAM(s) Oral daily  tamsulosin 0.4 milliGRAM(s) Oral at bedtime    PRN MEDICATIONS  dextrose 40% Gel 15 Gram(s) Oral once PRN  glucagon  Injectable 1 milliGRAM(s) IntraMuscular once PRN    VITALS:   T(F): 98.4  HR: 54  BP: 102/67  RR: 13  SpO2: 94%    LABS:                        11.4   7.45  )-----------( 165      ( 2019 10:39 )             36.2     01-    136  |  85<L>  |  82<HH>  ----------------------------<  161<H>  3.5   |  40<H>  |  2.8<H>    Ca    8.8      2019 10:39  Mg     3.2     -    TPro  7.3  /  Alb  3.6  /  TBili  1.5<H>  /  DBili  x   /  AST  31  /  ALT  28  /  AlkPhos  88      PT/INR - ( 2019 13:43 )   PT: 14.60 sec;   INR: 1.27 ratio      PTT - ( 2019 13:43 )  PTT:33.8 sec  Urinalysis Basic - ( 2019 17:00 )    Color: Yellow / Appearance: Clear / S.020 / pH: x  Gluc: x / Ketone: Negative  / Bili: Negative / Urobili: 1.0 mg/dL   Blood: x / Protein: Negative mg/dL / Nitrite: Negative   Leuk Esterase: Negative / RBC: x / WBC x   Sq Epi: x / Non Sq Epi: x / Bacteria: x    Troponin T, Serum: 0.06 ng/mL <HH> (19 @ 00:41)    CARDIAC MARKERS ( 2019 00:41 )  x     / 0.06 ng/mL / x     / x     / x      CARDIAC MARKERS ( 2019 13:43 )  x     / 0.07 ng/mL / 292 U/L / x     / x        RADIOLOGY:  AM CXR ordered  Echo ordered  Renal US ordered    PHYSICAL EXAM:  General Appearance: Normal	  Cardiovascular: Normal S1 S2, bradycardic, systolic murmur   Respiratory: decrease BS   Psychiatry: A & O x 3,   Gastrointestinal:  Soft, Non-tender  Skin: No rashes, No ecchymoses, No cyanosis	  Neurologic: Non-focal  Extremities: LE edema +1   Vascular: Peripheral pulses palpable

## 2019-01-27 NOTE — PROGRESS NOTE ADULT - SUBJECTIVE AND OBJECTIVE BOX
PASTOR NOONAN  68y  Male      Patient is a 68y old  Male who presents with a chief complaint of fall, LOC (2019 10:46)      INTERVAL HPI/OVERNIGHT EVENTS:  He feels ok, no acute complaints.     Vital Signs Last 24 Hrs  T(C): 36.2 (2019 11:55), Max: 36.9 (2019 20:00)  T(F): 97.2 (2019 11:55), Max: 98.4 (2019 20:00)  HR: 52 (2019 11:55) (48 - 56)  BP: 138/69 (2019 11:55) (90/45 - 138/69)  BP(mean): 101 (2019 11:55) (59 - 101)  RR: 17 (2019 11:55) (12 - 32)  SpO2: 97% (2019 11:55) (91% - 100%)      19 @ 07:19 @ 07:00  --------------------------------------------------------  IN: 750 mL / OUT: 1700 mL / NET: -950 mL    19 @ 07:19 @ 14:59  --------------------------------------------------------  IN: 600 mL / OUT: 900 mL / NET: -300 mL            Consultant(s) Notes Reviewed:  [x ] YES  [ ] NO          MEDICATIONS  (STANDING):  atorvastatin 40 milliGRAM(s) Oral at bedtime  buDESOnide 160 MICROgram(s)/formoterol 4.5 MICROgram(s) Inhaler 2 Puff(s) Inhalation two times a day  chlorhexidine 4% Liquid 1 Application(s) Topical <User Schedule>  dextrose 5%. 1000 milliLiter(s) (50 mL/Hr) IV Continuous <Continuous>  dextrose 50% Injectable 12.5 Gram(s) IV Push once  dextrose 50% Injectable 25 Gram(s) IV Push once  dextrose 50% Injectable 25 Gram(s) IV Push once  insulin glargine Injectable (LANTUS) 21 Unit(s) SubCutaneous at bedtime  insulin lispro (HumaLOG) corrective regimen sliding scale   SubCutaneous three times a day before meals  insulin lispro Injectable (HumaLOG) 8 Unit(s) SubCutaneous three times a day before meals  montelukast 10 milliGRAM(s) Oral daily  tamsulosin 0.4 milliGRAM(s) Oral at bedtime    MEDICATIONS  (PRN):  dextrose 40% Gel 15 Gram(s) Oral once PRN Blood Glucose LESS THAN 70 milliGRAM(s)/deciliter  glucagon  Injectable 1 milliGRAM(s) IntraMuscular once PRN Glucose LESS THAN 70 milligrams/deciliter      LABS                          11.1   8.63  )-----------( 170      ( 2019 04:26 )             34.8         135  |  88<L>  |  74<HH>  ----------------------------<  172<H>  3.4<L>   |  37<H>  |  2.5<H>    Ca    8.8      2019 04:26  Phos  3.1       Mg     3.3         TPro  7.3  /  Alb  3.6  /  TBili  1.5<H>  /  DBili  x   /  AST  31  /  ALT  28  /  AlkPhos  88        Urinalysis Basic - ( 2019 17:00 )    Color: Yellow / Appearance: Clear / S.020 / pH: x  Gluc: x / Ketone: Negative  / Bili: Negative / Urobili: 1.0 mg/dL   Blood: x / Protein: Negative mg/dL / Nitrite: Negative   Leuk Esterase: Negative / RBC: x / WBC x   Sq Epi: x / Non Sq Epi: x / Bacteria: x        Lactate Trend   @ 13:43 Lactate:2.5     CARDIAC MARKERS ( 2019 04:26 )  x     / 0.04 ng/mL / 171 U/L / x     / 2.5 ng/mL  CARDIAC MARKERS ( 2019 00:41 )  x     / 0.06 ng/mL / x     / x     / x          CAPILLARY BLOOD GLUCOSE  229 (2019 21:00)      POCT Blood Glucose.: 256 mg/dL (2019 12:58)        RADIOLOGY & ADDITIONAL TESTS:    Imaging Personally Reviewed:  [ ] YES  [ ] NO    HEALTH ISSUES - PROBLEM Dx:        PHYSICAL EXAM:  GENERAL: NAD, well-developed  HEAD:  Atraumatic, Normocephalic  EYES: EOMI, PERRLA, conjunctiva and sclera clear  NECK: Supple, No JVD  CHEST/LUNG: Clear to auscultation bilaterally; No wheeze  HEART: Regular rate and rhythm; S1 S2 Bradycardic.   ABDOMEN: Soft, Nontender, Nondistended; Bowel sounds present  EXTREMITIES: Chronic Lower extremities hyperpigmentation from venous stasis changes, pitting edema 2+, pulses are ok b/l.   PSYCH: AAOx3  NEUROLOGY: non-focal

## 2019-01-28 LAB
ANION GAP SERPL CALC-SCNC: 14 MMOL/L — SIGNIFICANT CHANGE UP (ref 7–14)
BASOPHILS # BLD AUTO: 0.04 K/UL — SIGNIFICANT CHANGE UP (ref 0–0.2)
BASOPHILS NFR BLD AUTO: 0.5 % — SIGNIFICANT CHANGE UP (ref 0–1)
BUN SERPL-MCNC: 49 MG/DL — HIGH (ref 10–20)
CALCIUM SERPL-MCNC: 8.9 MG/DL — SIGNIFICANT CHANGE UP (ref 8.5–10.1)
CHLORIDE SERPL-SCNC: 89 MMOL/L — LOW (ref 98–110)
CO2 SERPL-SCNC: 32 MMOL/L — SIGNIFICANT CHANGE UP (ref 17–32)
CREAT SERPL-MCNC: 1.8 MG/DL — HIGH (ref 0.7–1.5)
EOSINOPHIL # BLD AUTO: 0.12 K/UL — SIGNIFICANT CHANGE UP (ref 0–0.7)
EOSINOPHIL NFR BLD AUTO: 1.5 % — SIGNIFICANT CHANGE UP (ref 0–8)
ESTIMATED AVERAGE GLUCOSE: 163 MG/DL — HIGH (ref 68–114)
FOLATE SERPL-MCNC: >20 NG/ML — SIGNIFICANT CHANGE UP
GLUCOSE BLDC GLUCOMTR-MCNC: 125 MG/DL — HIGH (ref 70–99)
GLUCOSE BLDC GLUCOMTR-MCNC: 147 MG/DL — HIGH (ref 70–99)
GLUCOSE BLDC GLUCOMTR-MCNC: 148 MG/DL — HIGH (ref 70–99)
GLUCOSE BLDC GLUCOMTR-MCNC: 177 MG/DL — HIGH (ref 70–99)
GLUCOSE BLDC GLUCOMTR-MCNC: 181 MG/DL — HIGH (ref 70–99)
GLUCOSE SERPL-MCNC: 171 MG/DL — HIGH (ref 70–99)
HBA1C BLD-MCNC: 7.3 % — HIGH (ref 4–5.6)
HCT VFR BLD CALC: 34.8 % — LOW (ref 42–52)
HGB BLD-MCNC: 10.9 G/DL — LOW (ref 14–18)
IMM GRANULOCYTES NFR BLD AUTO: 0.4 % — HIGH (ref 0.1–0.3)
LYMPHOCYTES # BLD AUTO: 0.63 K/UL — LOW (ref 1.2–3.4)
LYMPHOCYTES # BLD AUTO: 7.9 % — LOW (ref 20.5–51.1)
MAGNESIUM SERPL-MCNC: 2.8 MG/DL — HIGH (ref 1.8–2.4)
MCHC RBC-ENTMCNC: 29.9 PG — SIGNIFICANT CHANGE UP (ref 27–31)
MCHC RBC-ENTMCNC: 31.3 G/DL — LOW (ref 32–37)
MCV RBC AUTO: 95.3 FL — HIGH (ref 80–94)
MONOCYTES # BLD AUTO: 0.89 K/UL — HIGH (ref 0.1–0.6)
MONOCYTES NFR BLD AUTO: 11.1 % — HIGH (ref 1.7–9.3)
NEUTROPHILS # BLD AUTO: 6.3 K/UL — SIGNIFICANT CHANGE UP (ref 1.4–6.5)
NEUTROPHILS NFR BLD AUTO: 78.6 % — HIGH (ref 42.2–75.2)
NRBC # BLD: 0 /100 WBCS — SIGNIFICANT CHANGE UP (ref 0–0)
PLATELET # BLD AUTO: 166 K/UL — SIGNIFICANT CHANGE UP (ref 130–400)
POTASSIUM SERPL-MCNC: 3.7 MMOL/L — SIGNIFICANT CHANGE UP (ref 3.5–5)
POTASSIUM SERPL-SCNC: 3.7 MMOL/L — SIGNIFICANT CHANGE UP (ref 3.5–5)
RBC # BLD: 3.65 M/UL — LOW (ref 4.7–6.1)
RBC # FLD: 17.4 % — HIGH (ref 11.5–14.5)
SODIUM SERPL-SCNC: 135 MMOL/L — SIGNIFICANT CHANGE UP (ref 135–146)
UUN UR-MCNC: 1062 MG/DL — SIGNIFICANT CHANGE UP
VIT B12 SERPL-MCNC: >2000 PG/ML — HIGH (ref 232–1245)
WBC # BLD: 8.01 K/UL — SIGNIFICANT CHANGE UP (ref 4.8–10.8)
WBC # FLD AUTO: 8.01 K/UL — SIGNIFICANT CHANGE UP (ref 4.8–10.8)

## 2019-01-28 RX ORDER — HYDROMORPHONE HYDROCHLORIDE 2 MG/ML
0.25 INJECTION INTRAMUSCULAR; INTRAVENOUS; SUBCUTANEOUS
Qty: 0 | Refills: 0 | Status: DISCONTINUED | OUTPATIENT
Start: 2019-01-28 | End: 2019-01-30

## 2019-01-28 RX ORDER — VANCOMYCIN HCL 1 G
1000 VIAL (EA) INTRAVENOUS ONCE
Qty: 0 | Refills: 0 | Status: COMPLETED | OUTPATIENT
Start: 2019-01-28 | End: 2019-01-28

## 2019-01-28 RX ORDER — LANOLIN ALCOHOL/MO/W.PET/CERES
5 CREAM (GRAM) TOPICAL ONCE
Qty: 0 | Refills: 0 | Status: COMPLETED | OUTPATIENT
Start: 2019-01-28 | End: 2019-01-28

## 2019-01-28 RX ORDER — VANCOMYCIN HCL 1 G
1500 VIAL (EA) INTRAVENOUS ONCE
Qty: 0 | Refills: 0 | Status: COMPLETED | OUTPATIENT
Start: 2019-01-28 | End: 2019-01-28

## 2019-01-28 RX ORDER — POTASSIUM CHLORIDE 20 MEQ
40 PACKET (EA) ORAL EVERY 4 HOURS
Qty: 0 | Refills: 0 | Status: COMPLETED | OUTPATIENT
Start: 2019-01-28 | End: 2019-01-28

## 2019-01-28 RX ORDER — ONDANSETRON 8 MG/1
4 TABLET, FILM COATED ORAL EVERY 4 HOURS
Qty: 0 | Refills: 0 | Status: DISCONTINUED | OUTPATIENT
Start: 2019-01-28 | End: 2019-01-31

## 2019-01-28 RX ORDER — OXYCODONE AND ACETAMINOPHEN 5; 325 MG/1; MG/1
1 TABLET ORAL ONCE
Qty: 0 | Refills: 0 | Status: DISCONTINUED | OUTPATIENT
Start: 2019-01-28 | End: 2019-01-28

## 2019-01-28 RX ORDER — VANCOMYCIN HCL 1 G
1500 VIAL (EA) INTRAVENOUS EVERY 12 HOURS
Qty: 0 | Refills: 0 | Status: COMPLETED | OUTPATIENT
Start: 2019-01-28 | End: 2019-01-29

## 2019-01-28 RX ADMIN — TAMSULOSIN HYDROCHLORIDE 0.4 MILLIGRAM(S): 0.4 CAPSULE ORAL at 22:40

## 2019-01-28 RX ADMIN — HYDROMORPHONE HYDROCHLORIDE 0.25 MILLIGRAM(S): 2 INJECTION INTRAMUSCULAR; INTRAVENOUS; SUBCUTANEOUS at 22:00

## 2019-01-28 RX ADMIN — MONTELUKAST 10 MILLIGRAM(S): 4 TABLET, CHEWABLE ORAL at 16:52

## 2019-01-28 RX ADMIN — Medication 250 MILLIGRAM(S): at 11:42

## 2019-01-28 RX ADMIN — CHLORHEXIDINE GLUCONATE 1 APPLICATION(S): 213 SOLUTION TOPICAL at 05:17

## 2019-01-28 RX ADMIN — Medication 40 MILLIEQUIVALENT(S): at 06:53

## 2019-01-28 RX ADMIN — Medication 8 UNIT(S): at 17:17

## 2019-01-28 RX ADMIN — ATORVASTATIN CALCIUM 40 MILLIGRAM(S): 80 TABLET, FILM COATED ORAL at 22:40

## 2019-01-28 RX ADMIN — INSULIN GLARGINE 21 UNIT(S): 100 INJECTION, SOLUTION SUBCUTANEOUS at 22:53

## 2019-01-28 RX ADMIN — HYDROMORPHONE HYDROCHLORIDE 0.25 MILLIGRAM(S): 2 INJECTION INTRAMUSCULAR; INTRAVENOUS; SUBCUTANEOUS at 23:48

## 2019-01-28 RX ADMIN — BUDESONIDE AND FORMOTEROL FUMARATE DIHYDRATE 2 PUFF(S): 160; 4.5 AEROSOL RESPIRATORY (INHALATION) at 08:00

## 2019-01-28 RX ADMIN — Medication 300 MILLIGRAM(S): at 23:32

## 2019-01-28 RX ADMIN — HYDROMORPHONE HYDROCHLORIDE 0.25 MILLIGRAM(S): 2 INJECTION INTRAMUSCULAR; INTRAVENOUS; SUBCUTANEOUS at 21:16

## 2019-01-28 RX ADMIN — Medication 5 MILLIGRAM(S): at 01:58

## 2019-01-28 RX ADMIN — Medication 300 MILLIGRAM(S): at 11:43

## 2019-01-28 RX ADMIN — BUDESONIDE AND FORMOTEROL FUMARATE DIHYDRATE 2 PUFF(S): 160; 4.5 AEROSOL RESPIRATORY (INHALATION) at 20:48

## 2019-01-28 NOTE — CONSULT NOTE ADULT - CONSULT REASON
"josé luis versus ckd, hypokalemia"
Bradycardia
R/o seizures
bradycardia
eval of T7 hyper lucency
s/p found down at the bottom of 5 steps, ?HT, ?LOC, ?AC
debilitation

## 2019-01-28 NOTE — CONSULT NOTE ADULT - CONSULT REQUESTED DATE/TIME
25-Jan-2019 13:59
25-Jan-2019 17:55
25-Jan-2019 19:15
26-Jan-2019 01:35
26-Jan-2019 02:00
27-Jan-2019 10:46
28-Jan-2019 17:08

## 2019-01-28 NOTE — PROGRESS NOTE ADULT - SUBJECTIVE AND OBJECTIVE BOX
Nephrology progress note    Patient is seen and examined, events over the last 24 h noted .  denied chest pain  No SOB     Allergies:  codeine (Unknown)    Hospital Medications:   MEDICATIONS  (STANDING):  atorvastatin 40 milliGRAM(s) Oral at bedtime  buDESOnide 160 MICROgram(s)/formoterol 4.5 MICROgram(s) Inhaler 2 Puff(s) Inhalation two times a day  chlorhexidine 4% Liquid 1 Application(s) Topical <User Schedule>  insulin glargine Injectable (LANTUS) 21 Unit(s) SubCutaneous at bedtime  insulin lispro (HumaLOG) corrective regimen sliding scale   SubCutaneous three times a day before meals  insulin lispro Injectable (HumaLOG) 8 Unit(s) SubCutaneous three times a day before meals  montelukast 10 milliGRAM(s) Oral daily  tamsulosin 0.4 milliGRAM(s) Oral at bedtime        VITALS:  T(F): 98.1 (19 @ 07:15), Max: 98.1 (19 @ 07:15)  HR: 56 (19 @ 08:42)  BP: 135/71 (19 @ 08:42)  RR: 14 (19 @ 08:42)  SpO2: 95% (19 @ 08:42)       @ 07: @ 07:00  --------------------------------------------------------  IN: 750 mL / OUT: 1700 mL / NET: -950 mL     @ 07:  -   @ 07:00  --------------------------------------------------------  IN: 1330 mL / OUT: 2625 mL / NET: -1295 mL     @ 07: @ 09:57  --------------------------------------------------------  IN: 0 mL / OUT: 350 mL / NET: -350 mL          PHYSICAL EXAM:  Constitutional: NAD  HEENT: anicteric sclera, oropharynx clear, MMM  Neck: No JVD  Respiratory: CTAB, no wheezes, rales or rhonchi  Cardiovascular: S1, S2, RRR  Gastrointestinal: BS+, soft, NT/ND  Extremities: No cyanosis or clubbing. trace edema   :  No little.   Skin: No rashes    LABS:      135  |  89<L>  |  49<H>  ----------------------------<  171<H>  3.7   |  32  |  1.8<H>  Creatinine Trend: 1.8<--, 2.1<--, 2.5<--, 2.8<--, 2.9<--, 3.3<--  Ca    8.9      2019 05:55  Phos  3.1       Mg     2.8         TPro  7.3  /  Alb  3.6  /  TBili  1.5<H>  /  DBili      /  AST  31  /  ALT  28  /  AlkPhos  88                            10.9   8.01  )-----------( 166      ( 2019 05:55 )             34.8       Urine Studies:  Urinalysis Basic - ( 2019 17:00 )    Color: Yellow / Appearance: Clear / S.020 / pH:   Gluc:  / Ketone: Negative  / Bili: Negative / Urobili: 1.0 mg/dL   Blood:  / Protein: Negative mg/dL / Nitrite: Negative   Leuk Esterase: Negative / RBC:  / WBC    Sq Epi:  / Non Sq Epi:  / Bacteria:       Sodium, Random Urine: 56.0 mmoL/L ( @ 21:10)  Creatinine, Random Urine: 95 mg/dL ( @ 21:10)    RADIOLOGY & ADDITIONAL STUDIES:  < from: Transthoracic Echocardiogram (19 @ 12:32) >   1. Technically difficult study.   2. Left ventricular ejection fraction, by visual estimation, is 65 to   70%.   3. Normal global left ventricular systolic function.   4. Spectral Doppler shows pseudonormal pattern of left ventricular   myocardial filling (Grade II diastolic dysfunction).   5. TAVR.   6. Mild perivalvular regurgitation.   7. Severe mitral annular calcification.   8. Mild mitral regurgitation.   9. Mild-to-moderate tricuspid regurgitation.  10. Estimated pulmonary artery systolic pressure is 54.7 mmHg assuming a   right atrial pressure of 8 mmHg, which is consistent with moderate   pulmonary hypertension.    < end of copied text >    < from: CT Abdomen and Pelvis w/ IV Cont (19 @ 14:58) >  IMPRESSION:    1. No CT evidence of acute traumatic intrathoracic or intra-abdominal   pathology.    2.  Mediastinal and lower paraesophageal lymphadenopathy.    3. Indeterminate lucent lesion within the T7 vertebral body. Nonemergent   thoracic spine MRI may be obtained for further evaluation.    < end of copied text >  < from: CT Abdomen and Pelvis w/ IV Cont (19 @ 14:58) >  KIDNEYS: Symmetric renal enhancement. No hydronephrosis.    < end of copied text >

## 2019-01-28 NOTE — CONSULT NOTE ADULT - ASSESSMENT
IMPRESSION: Rehab of Debilitation     PRECAUTIONS: [   x ] Cardiac  [    ] Respiratory  [    ] Seizures [    ] Contact Isolation  [    ] Droplet Isolation  [    ] Other    Weight Bearing Status:     RECOMMENDATION:  LIMIT ROM L SHOULDER- 70FLEX 70ABDUCTION    Out of Bed to Chair     DVT/Decubiti Prophylaxis    REHAB PLAN:     [    x ] Bedside P/T 3-5 times a week   [     ] Bedside O/T  2-3 times a week   [     ] No Rehab Therapy Indicated   [     ]  Speech Therapy   Conditioning/ROM                                 ADL  Bed Mobility                                            Conditioning/ROM  Transfers                                                  Bed Mobility  Sitting /Standing Balance                      Transfers                                        Gait Training                                            Sitting/Standing Balance  Stair Training [  x ]Applicable                 Home equipment Eval                                                                     Splinting  [   ] Only      GOALS:   ADL   [   x ]   Independent         Transfers  [   x ] Independent            Ambulation  [  x   ] Independent     [ x    ] With device                            [    ]  CG                                               [    ]  CG                                                    [     ] CG                            [    ] Min A                                          [    ] Min A                                                [     ] Min  A          DISCHARGE PLAN:   [     ]  Good candidate for Intensive Rehabilitation/Hospital based                                             Will tolerate 3hrs Intensive Rehab Daily                                       [      ]  Short Term Rehab in Skilled Nursing Facility                                       [ x     ]  Home with Outpatient or  services                                         [      ]  Possible Candidate for Intensive Hospital based Rehab

## 2019-01-28 NOTE — CHART NOTE - NSCHARTNOTEFT_GEN_A_CORE
Cardiac Electrophysiology Procedure Report    Date of procedure	2019  EP Attending	Kassandra Varghese MD  Anesthesiologist	Roger Acevedo MD  Procedure	Dual Chamber Pacemaker Implant & Loop Removal    Indication: 69 yo M with history of syncope and atrial fibrillation s/p loop recorder implant admitted with syncope and symptomatic bradycardia referred for DC Pacemaker implant and loop recorder removal.    EQUIPMENT REMOVED  :    [employer]  Model Number: [id number]  Serial Number:  [id number]    EQUIPMENT IMPLANTED AND BASELINE PARAMETERS  Pulse Generator   :	Medtronic	   Model Number:  	[id number]   Serial Number:    [id number]       Atrial Lead  : Medtronic  Model Number:	5076-52  Serial Number:	[id number]  Location: 	Right atrial appendage  Sensin.5 mV  Impedance:	437 Ohms  Threshold:	0.75 V at 0.40 ms    Right Ventricular Lead  : Medtronic  Model Number:	5076-58  Serial Number:  	[id number]  Location:            RV apical septum  Sensing:	             15.1 mV  Impedance:	589 Ohms  Threshold:	0.75 V at 0.40 ms      DESCRIPTION OF PROCEDURE  The patient was brought to the procedure room in a non-sedated and fasting state, having received preoperative antibiotics. Informed, written consent was obtained prior to the procedure. Conscious sedation was administered by the anesthesiologist. Blood pressure, oxygenation and level of comfort were stable throughout. The left shoulder region and anterior chest wall were cleaned and prepped with serial applications of Chlorhexidine solution. Patient was then covered from head to toe with sterile drapes in the usual manner.    The left deltopectoral groove region was defined; 20 cc of premixed 50/50 lidocaine/sensorcaine solution was administered. A 3.5 cm incision was made along the left deltopectoral groove / parallel to and 2 cm below the left clavicle. The incision was extended down to the level of the anterior pectoralis fascia using electrocautery and blunt dissection.     In the groove, the cephalic vein was identified, isolated, and controlled with proximal and distal 2.0 TiCron ties. Between the ties, a venotomy incision was made; through the venotomy, two guidewires were advanced through an 18G angiocath to the inferior vena cava under fluoroscopic guidance.      Over the first guidewire, a 7 Fr sheath was advanced into the cephalic vein.  Through this sheath, using a combination of straight and curved stylets, the right ventricular lead was passed across the tricuspid annulus, advanced into the right ventricular outflow tract, and then pulled down against the interventricular septum under fluoroscopic guidance. A site was found in the RV apical septum with adequate sensing and the lead’s screw was extended. There was adequate sensing, impedance and threshold without diaphragmatic stimulation at maximum output. The lead's position and adequate slack were confirmed in Serbian and MILLAN projections.  The introducer sheath was split and removed.     Next a 7 Fr sheath was advanced over the second guidewire into the cephalic vein under fluoroscopic guidance.  The dilator and guidewire were removed and, through this sheath, the atrial pacing lead was advanced into the heart under fluoroscopic guidance.  Next, using a J curved stylet the atrial lead was placed in the right atrial appendage.  Once adequate sensing was obtained, the fixation screw was extended. Adequate sensing, impedance, and threshold with no evidence of diaphragmatic pacing at maximal output. The lead position was confirmed in the Serbian and MILLAN projections.  The sheath was split and removed.     Adequate slack was placed into both the atrial and ventricular leads. The sewing sleeves were positioned at the site of entry into the vein, and the leads were secured to the pre-pectoral fascia with 2-0 silk sutures tied around each sewing sleeve respectively.     The plane between the prepectoral fascia and the pectoralis muscle was exposed to create a pocket for the pulse generator.  Next, the pocket was inspected for bleeding, and electrocautery applied where appropriate. The wound was irrigated with copious amounts of vancomycin solution. The lead was wiped clean and then connected to the pulse generator. The leads and pulse generator were coiled into the pocket.  	    The wound margins were approximated well, without any tension or overlap. The wound was closed using an interrupted layer of 2-0 absorbable Vicryl suture for the deep fascial layer. This was followed by a continuous layer of 3-0 absorbable Vicryl suture. The skin layer was approximated with Dermabond. Steri-strips were applied over this and a dry, sterile dressing was placed over this. A pressure dressing was placed over this.    Following infiltration with local anesthetic, a 1-cm incision was made along the left sternal border at the fourth intercostal space over the previous scar. The implantable loop recorder was removed from the subcutaneous tissue. Interrupted 2-0 absorbable Vicryl suture was used for the deep fascial layer. This was followed by a continuous layer of 3-0 absorbable Vicryl suture. The wound was approximated using Dermabond. Steristrips and a dry, sterile dressing were placed over the wound.   Needle count was performed and found to be consistent at the end of the procedure. The patient was returned to a hospital room in stable condition.       COMPLICATIONS:  The patient tolerated the procedure well. There were no immediate complications.     CONCLUSIONS:  Successful implantation of Dual Chamber Pacemaker & Removal of Loop Recorder    EBL: 40 cc    FLUORO: 8.4 min      _______________________________  Kassandra Varghese MD  Cardiac Electrophysiology

## 2019-01-28 NOTE — PROGRESS NOTE ADULT - ASSESSMENT
Jack Sweet is a 68M with PMH of aortic stenosis s/p TAVR, CHF, CKD, atrial fibrillation s/p ablation, DM, BPH, DLD, and COPD who is admitted to CCU with symptomatic bradycardia.     #Syncopal episode 2/2 bradycardia 2/2 sick sinus syndrome   -Patient currently down for PPM placement 1/28  -Hold amiodarone, metoprolol, digoxin  -Trend cardiac enzymes, monitor on telemetry and with daily EKGs  -2D echo ordered, follow up results  -TSH 1.48 on 1/26/19 (WNL)  -Generalized and focal slowing on EEG, no seizure activity   -EPS on board (Dr. Mauro)   -Resume anticoagulation after PPM    #ANGELINA on CKD   -Cr 1.8 <-- 2.1 <-- 2.8 <-- 3.3 (on admission)  -F/u Renal US (never done; reordered 1/28)  -F/u urine protein/cr ratio  -Monitor electrolytes, UOP     #Macrocytic Anemia  -Hb 10.9 <-- 11.1 <--11.4  -Follow up B12 and folate  -Monitor CBC     #Paroxysmal Atrial Fibrillation s/p ablation  -CHADSVASC 3  -Xarelto on hold for pacemaker implantation  -Metoprolol on hold 2/2 bradycardia     # Hypokalemia s/p PO repletion 1/28  F/u AM BMP    #DM  -A1C 7.4  -Adjust insulin PRN to achieve BG < 180   -Monitor FS, carbohydrate consistent diet    #BPH  -Continue tamsulosin     #DLD  -Continue statin     #COPD  -Continue Symbicort and Singulair     #DVT ppx: heparin sub-q, hold after today's dose   #GI prophylaxis: not indicated   #Diet: CC   #Activity: increase as tolerated   FULL CODE   CCU monitoring

## 2019-01-28 NOTE — PRE-ANESTHESIA EVALUATION ADULT - NSANTHPMHFT_GEN_ALL_CORE
Neuro: peripheral neuropathy, back pain  Pulm: COPD, +snoring, likely with AMY  CV: HTN, HLD, CAD s/p stent in 2012, afib s/p ablation, symptomatic bradycardia p/w syncope, AS s/p TAVR, CHF  Renal: CKD (admission Cr 3, downtrending)  Endo: DM, morbid obesity  Other: ex-smoker Neuro: peripheral neuropathy, back pain  Pulm: COPD, +snoring, likely with AMY  CV: HTN, HLD, CAD s/p stent in 2012, afib s/p ablation, symptomatic bradycardia p/w syncope, AS s/p TAVR, CHF, moderate pulmonary hypertension (PA ~51)  Renal: ANGELINA superimposed on CKD (admission Cr 3, downtrending)  Endo: DM, morbid obesity  Other: ex-smoker

## 2019-01-28 NOTE — PROGRESS NOTE ADULT - SUBJECTIVE AND OBJECTIVE BOX
PASTOR NOONAN 68y Male  MRN#: [id number]       SUBJECTIVE  Patient is a 68y old Male who presents with a chief complaint of fall, LOC (28 Jan 2019 09:57)  Currently admitted to medicine with the primary diagnosis of Bradycardia.    No acute events overnight per MD, RN. This AM, patient was very anxious in advance of PPM placement scheduled for today. I reassured him that it is a standard, common procedure and that it will be done as soon as today's schedule allows.    OBJECTIVE  PAST MEDICAL & SURGICAL HISTORY  HLD (hyperlipidemia)  HTN (hypertension)  Diabetes  H/O prior ablation treatment  S/P TAVR (transcatheter aortic valve replacement)    ALLERGIES:  codeine (Unknown)    MEDICATIONS:  STANDING MEDICATIONS  atorvastatin 40 milliGRAM(s) Oral at bedtime  buDESOnide 160 MICROgram(s)/formoterol 4.5 MICROgram(s) Inhaler 2 Puff(s) Inhalation two times a day  chlorhexidine 4% Liquid 1 Application(s) Topical <User Schedule>  dextrose 5%. 1000 milliLiter(s) IV Continuous <Continuous>  dextrose 50% Injectable 12.5 Gram(s) IV Push once  dextrose 50% Injectable 25 Gram(s) IV Push once  insulin glargine Injectable (LANTUS) 21 Unit(s) SubCutaneous at bedtime  insulin lispro (HumaLOG) corrective regimen sliding scale   SubCutaneous three times a day before meals  insulin lispro Injectable (HumaLOG) 8 Unit(s) SubCutaneous three times a day before meals  montelukast 10 milliGRAM(s) Oral daily  potassium chloride    Tablet ER 40 milliEquivalent(s) Oral every 4 hours  tamsulosin 0.4 milliGRAM(s) Oral at bedtime  vancomycin  IVPB 1000 milliGRAM(s) IV Intermittent once  vancomycin  IVPB 1500 milliGRAM(s) IV Intermittent every 12 hours    PRN MEDICATIONS  dextrose 40% Gel 15 Gram(s) Oral once PRN  glucagon  Injectable 1 milliGRAM(s) IntraMuscular once PRN      VITAL SIGNS: Last 24 Hours  T(C): 0 (28 Jan 2019 12:13), Max: 36.7 (28 Jan 2019 00:06)  T(F): 98.1 (28 Jan 2019 07:15), Max: 98.1 (28 Jan 2019 07:15)  HR: 56 (28 Jan 2019 12:13) (54 - 66)  BP: 128/46 (28 Jan 2019 12:13) (97/46 - 136/60)  BP(mean): 85 (28 Jan 2019 12:13) (60 - 96)  RR: 13 (28 Jan 2019 12:13) (13 - 262)  SpO2: 100% (28 Jan 2019 12:13) (92% - 100%)    LABS:                        10.9   8.01  )-----------( 166      ( 28 Jan 2019 05:55 )             34.8     01-28    135  |  89<L>  |  49<H>  ----------------------------<  171<H>  3.7   |  32  |  1.8<H>    Ca    8.9      28 Jan 2019 05:55  Phos  3.1     01-27  Mg     2.8     01-28      CARDIAC MARKERS ( 27 Jan 2019 04:26 )  x     / 0.04 ng/mL / 171 U/L / x     / 2.5 ng/mL      RADIOLOGY:    Findings:    Support devices: EKG leads. Loop recorder is noted.    Cardiac/mediastinum/hilum: Heart size is enlarged. Patient is status post   left atrial appendage fixation device. Status post TAVR.     Lung parenchyma/Pleura: Mild bilateral diffuse interstitial opacities. No   definite pneumothorax.    Skeleton/soft tissues: Unremarkable.    Impression:      Mild bilateral diffuse interstitial opacities.    Post TAVR and post left atrial appendage fixation device.    -------------------------------------    Transesophageal Echo 1/27/19:  Summary:   1. Technically difficult study.   2. Left ventricular ejection fraction, by visual estimation, is 65 to   70%.   3. Normal global left ventricular systolic function.   4. Spectral Doppler shows pseudonormal pattern of left ventricular   myocardial filling (Grade II diastolic dysfunction).   5. TAVR.   6. Mild perivalvular regurgitation.   7. Severe mitral annular calcification.   8. Mild mitral regurgitation.   9. Mild-to-moderate tricuspid regurgitation.  10. Estimated pulmonary artery systolic pressure is 54.7 mmHg assuming a   right atrial pressure of 8 mmHg, which is consistent with moderate   pulmonary hypertension.      PHYSICAL EXAM:    GENERAL: NAD, well-developed  HEENT:  Atraumatic, Normocephalic. EOMI, PERRLA, conjunctiva and sclera clear, No JVD  PULMONARY: Clear to auscultation bilaterally; No wheeze  CARDIOVASCULAR: Regular rate and rhythm; No murmurs, rubs, or gallops  GASTROINTESTINAL: Soft, Nontender, Nondistended  MUSCULOSKELETAL:  2+ Peripheral Pulses, No clubbing, cyanosis, or edema  NEUROLOGY: non-focal  SKIN: No rashes or lesions

## 2019-01-28 NOTE — PROGRESS NOTE ADULT - SUBJECTIVE AND OBJECTIVE BOX
Electrophysiology Brief Post-Op Note    I have personally seen and examined the patient.  I agree with the history and physical which I have reviewed and noted any changes below.     PRE-OP DIAGNOSIS: Syncope, Sinus bradycardia    POST-OP DIAGNOSIS: Syncope, Sinus Bradycardia    PROCEDURE: DC PPM Implant & Loop Removal    Physician: Kassandra Varghese MD  Assistant: None    ESTIMATED BLOOD LOSS:   40  mL    ANESTHESIA TYPE:  [  ]General Anesthesia  [ x ] Sedation  [ x ] Local/Regional    CONDITION  [  ] Critical  [  ] Serious  [  ]Fair  [ x ]Good      SPECIMENS REMOVED (IF APPLICABLE): Loop    IMPLANTS (IF APPLICABLE): Medtronic DC PPM Implant    FINDINGS  PLAN OF CARE  - Vancomycin 1g IV q12 h  - Chest X-ray PA now and PA/Lat tomorrow am  - Device interrogation tomorrow in am  - NO heparin products or lovenox  - No anticoagulation unless recommended by EP attending      Kassandra Varghese MD   Electrophysiology Attending

## 2019-01-28 NOTE — CONSULT NOTE ADULT - SUBJECTIVE AND OBJECTIVE BOX
Patient is a 68y old  Male who presents with a chief complaint of fall, LOC (28 Jan 2019 14:38)    HPI:  68 year old M PMH HTN. HLD, and DM, CHF, BPH, DLD, asthma/COPD, atrial fibrillation S/P ablation 9 months ago, valvular disorder S/P TAVR 9 months ago brought in by EMS after being found unresponsive on floor with urine and fecal incontinence and unwitnessed fall down 5 steps.  Patient was found at the bottom of 5 steps at his doctor's office, patient states he has no recollection of the fall.  Patient is unsure if he sustained head trauma, denies LOC and denies AC use. Patient mentioned that for the last week he was feeling dizzy and having unsteady gait from dizziness, she checked his pulse and it was low, sometimes reaching 40 , and his blood pressure was intermittently low. Patient mentioned he had another syncope episode around 2 month ago, he had was driving , felt dizzy, he has to pull over then he lost his consciousness and did not remember what happen. Patient  denies any chest pain during the last week, no SOB denied , nausea, vomiting, abdominal pain.  Patient was very confused on how the fall happened.  Patient was trauma alert in ED . In Ed patient was found in sinus bradycardia , and was given atropin in ED.  To note , patient was on bumetanide  and it was stopped by his nephrologist for dehydration and angelina during the last week. (25 Jan 2019 21:49)      PAST MEDICAL & SURGICAL HISTORY:  HLD (hyperlipidemia)  HTN (hypertension)  Diabetes  H/O prior ablation treatment  S/P TAVR (transcatheter aortic valve replacement)      Hospital Course: yncopal episode 2/2 bradycardia 2/2 sick sinus syndrome   -SP PPM placement 1/28  -Hold amiodarone, metoprolol, digoxin  -Trend cardiac enzymes, monitor on telemetry and with daily EKGs  -2D echo ordered, follow up results  -TSH 1.48 on 1/26/19 (WNL)  -Generalized and focal slowing on EEG, no seizure activity   -EPS on board (Dr. Mauro)   -Resume anticoagulation after PPM    #ANGELINA on CKD   -Cr 1.8 <-- 2.1 <-- 2.8 <-- 3.3 (on admission)  -F/u Renal US (never done; reordered 1/28)  -F/u urine protein/cr ratio  -Monitor electrolytes, UOP     #Macrocytic Anemia  -Hb 10.9 <-- 11.1 <--11.4  -Follow up B12 and folate  -Monitor CBC     #Paroxysmal Atrial Fibrillation s/p ablation  -CHADSVASC 3  -Xarelto on hold for pacemaker implantation  -Metoprolol on hold 2/2 bradycardia     # Hypokalemia s/p PO repletion 1/28  F/u AM BMP    #DM  -A1C 7.4  -Adjust insulin PRN to achieve BG < 180   -Monitor FS, carbohydrate consistent diet    #BPH  -Continue tamsulosin     #DLD  -Continue statin     #COPD  -Continue Symbicort and Singulair       TODAY'S SUBJECTIVE & REVIEW OF SYMPTOMS:     Constitutional Weakness   Cardio WNL   Resp WNL   GI WNL  Heme WNL  Endo WNL  Skin WNL  MSK WNL  Neuro WNL  Cognitive WNL  Psych WNL      MEDICATIONS  (STANDING):  atorvastatin 40 milliGRAM(s) Oral at bedtime  buDESOnide 160 MICROgram(s)/formoterol 4.5 MICROgram(s) Inhaler 2 Puff(s) Inhalation two times a day  chlorhexidine 4% Liquid 1 Application(s) Topical <User Schedule>  dextrose 5%. 1000 milliLiter(s) (50 mL/Hr) IV Continuous <Continuous>  dextrose 50% Injectable 12.5 Gram(s) IV Push once  dextrose 50% Injectable 25 Gram(s) IV Push once  dextrose 50% Injectable 25 Gram(s) IV Push once  insulin glargine Injectable (LANTUS) 21 Unit(s) SubCutaneous at bedtime  insulin lispro (HumaLOG) corrective regimen sliding scale   SubCutaneous three times a day before meals  insulin lispro Injectable (HumaLOG) 8 Unit(s) SubCutaneous three times a day before meals  montelukast 10 milliGRAM(s) Oral daily  tamsulosin 0.4 milliGRAM(s) Oral at bedtime  vancomycin  IVPB 1500 milliGRAM(s) IV Intermittent every 12 hours    MEDICATIONS  (PRN):  dextrose 40% Gel 15 Gram(s) Oral once PRN Blood Glucose LESS THAN 70 milliGRAM(s)/deciliter  glucagon  Injectable 1 milliGRAM(s) IntraMuscular once PRN Glucose LESS THAN 70 milligrams/deciliter  HYDROmorphone  Injectable 0.25 milliGRAM(s) IV Push every 10 minutes PRN Moderate Pain (4 - 6)  ondansetron Injectable 4 milliGRAM(s) IV Push every 4 hours PRN Nausea and/or Vomiting      FAMILY HISTORY:  Family history of hypertension (Father)      Allergies    codeine (Unknown)    Intolerances        SOCIAL HISTORY:    [    ] Etoh  [ x   ] SmokingEX  [    ] Substance abuse     Home Environment:  [    ] Home Alone  [  x  ] Lives with Family SON  [    ] Home Health Aid    Dwelling:  [    ] Apartment  [  x  ] Private House  [    ] Adult Home  [    ] Skilled Nursing Facility      [    ] Short Term  [    ] Long Term  [x    ] Stairs                           [    ] Elevator     FUNCTIONAL STATUS PTA: (Check all that apply)  Ambulation: [  x   ]Independent    [    ] Dependent     [    ] Non-Ambulatory  Assistive Device: [    ] SA Cane  [    ]  Q Cane  [    ] Walker  [    ]  Wheelchair  ADL : [  x  ] Independent  [    ]  Dependent       Vital Signs Last 24 Hrs  T(C): 0 (28 Jan 2019 12:13), Max: 36.7 (28 Jan 2019 00:06)  T(F): 98.1 (28 Jan 2019 07:15), Max: 98.1 (28 Jan 2019 07:15)  HR: 56 (28 Jan 2019 12:13) (54 - 66)  BP: 128/46 (28 Jan 2019 12:13) (97/46 - 136/60)  BP(mean): 85 (28 Jan 2019 12:13) (60 - 96)  RR: 13 (28 Jan 2019 12:13) (13 - 22)  SpO2: 100% (28 Jan 2019 12:13) (92% - 100%)      PHYSICAL EXAM: Alert & Oriented X3 Obese  GENERAL: NAD, well-groomed, well-developed  HEAD:  Atraumatic, Normocephalic  EYES: EOMI, PERRLA,  NECK: Supple  CHEST/LUNG: Clear bilaterally  HEART: Regular rate and rhythm  ABDOMEN: Soft, Nontender, Nondistended; Bowel sounds present  EXTREMITIES: Stasis changes BLES L Shoulder PPM Sit bandaged    NERVOUS SYSTEM:  Cranial Nerves 2-12 intact [   x ] Abnormal  [    ]  ROM: WFL all extremities [    ]  Abnormal [  x   ]L Shoulder not tessted  Motor Strength: WFL all extremities  [    ]  Abnormal [  x  ]L shoulder not tested otherwise 4/5  Sensation: intact to light touch [ x   ] Abnormal [    ]      FUNCTIONAL STATUS:  Bed Mobility: [   ]  Independent [    ]  Supervision [  x  ]  Needs Assistance [  ]  N/A  Transfers: [    ]  Independent [    ]  Supervision [    ]  Needs Assistance [   x ]  N/A    Ambulation:  [    ]  Independent [    ]  Supervision [    ]  Needs Assistance [  x  ]  N/A   ADL:  [    ]   Independent [ x   ] Requires Assistance [    ] N/A   AT BEDREST POST PPM    LABS:                        10.9   8.01  )-----------( 166      ( 28 Jan 2019 05:55 )             34.8     01-28    135  |  89<L>  |  49<H>  ----------------------------<  171<H>  3.7   |  32  |  1.8<H>    Ca    8.9      28 Jan 2019 05:55  Phos  3.1     01-27  Mg     2.8     01-28            RADIOLOGY & ADDITIONAL STUDIES:

## 2019-01-28 NOTE — PROGRESS NOTE ADULT - ASSESSMENT
68M with PMH of aortic stenosis s/p TAVR, CHF, CKD, atrial fibrillation s/p ablation, DM, BPH, DLD, and COPD who presented 1/25 w/ syncope found w/ symptomatic bradycardia.         Baseline renal function unknown, he reports hx of CKD at least > yr  May have ANGELINA in setting of acute event, Cr is downtrending (not on fluids nor diuretics) though is vol up at some point will need diuretics  ·	ANGELINA on CKD creat trending down   ·	Non oliguric   ·	if UO decreases consider lasix IV   ·	Trend Cr, outpt labs would be most helpful  ·	Neg hematuria and albuminuria, unlikely Glomerular process, though send Prot/Cr ratio given his edema (UA checks albumin only)   ·	for PPM today

## 2019-01-29 LAB
ALBUMIN SERPL ELPH-MCNC: 3.3 G/DL — LOW (ref 3.5–5.2)
ALP SERPL-CCNC: 87 U/L — SIGNIFICANT CHANGE UP (ref 30–115)
ALT FLD-CCNC: 22 U/L — SIGNIFICANT CHANGE UP (ref 0–41)
ANION GAP SERPL CALC-SCNC: 15 MMOL/L — HIGH (ref 7–14)
AST SERPL-CCNC: 24 U/L — SIGNIFICANT CHANGE UP (ref 0–41)
BASOPHILS # BLD AUTO: 0.03 K/UL — SIGNIFICANT CHANGE UP (ref 0–0.2)
BASOPHILS NFR BLD AUTO: 0.4 % — SIGNIFICANT CHANGE UP (ref 0–1)
BILIRUB SERPL-MCNC: 1.7 MG/DL — HIGH (ref 0.2–1.2)
BUN SERPL-MCNC: 33 MG/DL — HIGH (ref 10–20)
CALCIUM SERPL-MCNC: 8.9 MG/DL — SIGNIFICANT CHANGE UP (ref 8.5–10.1)
CHLORIDE SERPL-SCNC: 93 MMOL/L — LOW (ref 98–110)
CO2 SERPL-SCNC: 27 MMOL/L — SIGNIFICANT CHANGE UP (ref 17–32)
CREAT ?TM UR-MCNC: 14 MG/DL — SIGNIFICANT CHANGE UP
CREAT SERPL-MCNC: 1.5 MG/DL — SIGNIFICANT CHANGE UP (ref 0.7–1.5)
EOSINOPHIL # BLD AUTO: 0.13 K/UL — SIGNIFICANT CHANGE UP (ref 0–0.7)
EOSINOPHIL NFR BLD AUTO: 1.8 % — SIGNIFICANT CHANGE UP (ref 0–8)
GAS PNL BLDA: SIGNIFICANT CHANGE UP
GLUCOSE BLDC GLUCOMTR-MCNC: 156 MG/DL — HIGH (ref 70–99)
GLUCOSE BLDC GLUCOMTR-MCNC: 176 MG/DL — HIGH (ref 70–99)
GLUCOSE BLDC GLUCOMTR-MCNC: 182 MG/DL — HIGH (ref 70–99)
GLUCOSE BLDC GLUCOMTR-MCNC: 198 MG/DL — HIGH (ref 70–99)
GLUCOSE SERPL-MCNC: 127 MG/DL — HIGH (ref 70–99)
HCT VFR BLD CALC: 33.6 % — LOW (ref 42–52)
HGB BLD-MCNC: 10.4 G/DL — LOW (ref 14–18)
IMM GRANULOCYTES NFR BLD AUTO: 0.4 % — HIGH (ref 0.1–0.3)
LYMPHOCYTES # BLD AUTO: 0.65 K/UL — LOW (ref 1.2–3.4)
LYMPHOCYTES # BLD AUTO: 9.1 % — LOW (ref 20.5–51.1)
MAGNESIUM SERPL-MCNC: 2.2 MG/DL — SIGNIFICANT CHANGE UP (ref 1.8–2.4)
MCHC RBC-ENTMCNC: 29.5 PG — SIGNIFICANT CHANGE UP (ref 27–31)
MCHC RBC-ENTMCNC: 31 G/DL — LOW (ref 32–37)
MCV RBC AUTO: 95.5 FL — HIGH (ref 80–94)
MONOCYTES # BLD AUTO: 0.85 K/UL — HIGH (ref 0.1–0.6)
MONOCYTES NFR BLD AUTO: 11.9 % — HIGH (ref 1.7–9.3)
NEUTROPHILS # BLD AUTO: 5.48 K/UL — SIGNIFICANT CHANGE UP (ref 1.4–6.5)
NEUTROPHILS NFR BLD AUTO: 76.4 % — HIGH (ref 42.2–75.2)
NRBC # BLD: 0 /100 WBCS — SIGNIFICANT CHANGE UP (ref 0–0)
PLATELET # BLD AUTO: 150 K/UL — SIGNIFICANT CHANGE UP (ref 130–400)
POTASSIUM SERPL-MCNC: 4.1 MMOL/L — SIGNIFICANT CHANGE UP (ref 3.5–5)
POTASSIUM SERPL-SCNC: 4.1 MMOL/L — SIGNIFICANT CHANGE UP (ref 3.5–5)
PROT ?TM UR-MCNC: <5 MG/DLG/24H — SIGNIFICANT CHANGE UP
PROT SERPL-MCNC: 6.7 G/DL — SIGNIFICANT CHANGE UP (ref 6–8)
PROT/CREAT UR-RTO: <0.4 RATIO — HIGH (ref 0–0.2)
RBC # BLD: 3.52 M/UL — LOW (ref 4.7–6.1)
RBC # FLD: 17.3 % — HIGH (ref 11.5–14.5)
SODIUM SERPL-SCNC: 135 MMOL/L — SIGNIFICANT CHANGE UP (ref 135–146)
WBC # BLD: 7.17 K/UL — SIGNIFICANT CHANGE UP (ref 4.8–10.8)
WBC # FLD AUTO: 7.17 K/UL — SIGNIFICANT CHANGE UP (ref 4.8–10.8)

## 2019-01-29 RX ORDER — FUROSEMIDE 40 MG
40 TABLET ORAL ONCE
Qty: 0 | Refills: 0 | Status: COMPLETED | OUTPATIENT
Start: 2019-01-29 | End: 2019-01-29

## 2019-01-29 RX ORDER — LANOLIN ALCOHOL/MO/W.PET/CERES
5 CREAM (GRAM) TOPICAL AT BEDTIME
Qty: 0 | Refills: 0 | Status: DISCONTINUED | OUTPATIENT
Start: 2019-01-29 | End: 2019-01-31

## 2019-01-29 RX ORDER — DIGOXIN 250 MCG
1 TABLET ORAL
Qty: 0 | Refills: 0 | COMMUNITY

## 2019-01-29 RX ORDER — FUROSEMIDE 40 MG
20 TABLET ORAL ONCE
Qty: 0 | Refills: 0 | Status: COMPLETED | OUTPATIENT
Start: 2019-01-29 | End: 2019-01-29

## 2019-01-29 RX ORDER — AMIODARONE HYDROCHLORIDE 400 MG/1
200 TABLET ORAL DAILY
Qty: 0 | Refills: 0 | Status: DISCONTINUED | OUTPATIENT
Start: 2019-01-29 | End: 2019-01-31

## 2019-01-29 RX ORDER — METOPROLOL TARTRATE 50 MG
100 TABLET ORAL DAILY
Qty: 0 | Refills: 0 | Status: DISCONTINUED | OUTPATIENT
Start: 2019-01-29 | End: 2019-01-31

## 2019-01-29 RX ORDER — ACETAMINOPHEN 500 MG
975 TABLET ORAL ONCE
Qty: 0 | Refills: 0 | Status: COMPLETED | OUTPATIENT
Start: 2019-01-29 | End: 2019-01-29

## 2019-01-29 RX ORDER — CEPHALEXIN 500 MG
500 CAPSULE ORAL EVERY 12 HOURS
Qty: 0 | Refills: 0 | Status: DISCONTINUED | OUTPATIENT
Start: 2019-01-29 | End: 2019-01-31

## 2019-01-29 RX ORDER — CEPHALEXIN 500 MG
1 CAPSULE ORAL
Qty: 10 | Refills: 0 | OUTPATIENT
Start: 2019-01-29 | End: 2019-02-02

## 2019-01-29 RX ADMIN — HYDROMORPHONE HYDROCHLORIDE 0.25 MILLIGRAM(S): 2 INJECTION INTRAMUSCULAR; INTRAVENOUS; SUBCUTANEOUS at 22:47

## 2019-01-29 RX ADMIN — Medication 8 UNIT(S): at 08:48

## 2019-01-29 RX ADMIN — Medication 8 UNIT(S): at 12:23

## 2019-01-29 RX ADMIN — Medication 40 MILLIGRAM(S): at 13:50

## 2019-01-29 RX ADMIN — Medication 100 MILLIGRAM(S): at 17:25

## 2019-01-29 RX ADMIN — BUDESONIDE AND FORMOTEROL FUMARATE DIHYDRATE 2 PUFF(S): 160; 4.5 AEROSOL RESPIRATORY (INHALATION) at 08:47

## 2019-01-29 RX ADMIN — Medication 300 MILLIGRAM(S): at 14:49

## 2019-01-29 RX ADMIN — Medication 975 MILLIGRAM(S): at 15:25

## 2019-01-29 RX ADMIN — MONTELUKAST 10 MILLIGRAM(S): 4 TABLET, CHEWABLE ORAL at 12:24

## 2019-01-29 RX ADMIN — Medication 975 MILLIGRAM(S): at 14:55

## 2019-01-29 RX ADMIN — HYDROMORPHONE HYDROCHLORIDE 0.25 MILLIGRAM(S): 2 INJECTION INTRAMUSCULAR; INTRAVENOUS; SUBCUTANEOUS at 00:15

## 2019-01-29 RX ADMIN — HYDROMORPHONE HYDROCHLORIDE 0.25 MILLIGRAM(S): 2 INJECTION INTRAMUSCULAR; INTRAVENOUS; SUBCUTANEOUS at 23:30

## 2019-01-29 RX ADMIN — TAMSULOSIN HYDROCHLORIDE 0.4 MILLIGRAM(S): 0.4 CAPSULE ORAL at 22:18

## 2019-01-29 RX ADMIN — Medication 500 MILLIGRAM(S): at 17:26

## 2019-01-29 RX ADMIN — INSULIN GLARGINE 21 UNIT(S): 100 INJECTION, SOLUTION SUBCUTANEOUS at 22:30

## 2019-01-29 RX ADMIN — Medication 1: at 12:24

## 2019-01-29 RX ADMIN — ATORVASTATIN CALCIUM 40 MILLIGRAM(S): 80 TABLET, FILM COATED ORAL at 22:18

## 2019-01-29 RX ADMIN — BUDESONIDE AND FORMOTEROL FUMARATE DIHYDRATE 2 PUFF(S): 160; 4.5 AEROSOL RESPIRATORY (INHALATION) at 22:18

## 2019-01-29 RX ADMIN — Medication 1: at 08:48

## 2019-01-29 RX ADMIN — AMIODARONE HYDROCHLORIDE 200 MILLIGRAM(S): 400 TABLET ORAL at 17:25

## 2019-01-29 NOTE — PROGRESS NOTE ADULT - SUBJECTIVE AND OBJECTIVE BOX
Patient is a 68y old  Male who presents with a chief complaint of fall, LOC (29 Jan 2019 09:58)    HPI:  68 year old M PMH HTN. HLD, and DM, CHF, BPH, DLD, asthma/COPD, atrial fibrillation S/P ablation 9 months ago, valvular disorder S/P TAVR 9 months ago brought in by EMS after being found unresponsive on floor with urine and fecal incontinence and unwitnessed fall down 5 steps.  Patient was found at the bottom of 5 steps at his doctor's office, patient states he has no recollection of the fall.  Patient is unsure if he sustained head trauma, denies LOC and denies AC use. Patient mentioned that for the last week he was feeling dizzy and having unsteady gait from dizziness, she checked his pulse and it was low, sometimes reaching 40 , and his blood pressure was intermittently low. Patient mentioned he had another syncope episode around 2 month ago, he had was driving , felt dizzy, he has to pull over then he lost his consciousness and did not remember what happen. Patient  denies any chest pain during the last week, no SOB denied , nausea, vomiting, abdominal pain.  Patient was very confused on how the fall happened.  Patient was trauma alert in ED . In Ed patient was found in sinus bradycardia , and was given atropin in ED.  To note , patient was on bumetanide  and it was stopped by his nephrologist for dehydration and josé luis during the last week. (25 Jan 2019 21:49)    PAST MEDICAL & SURGICAL HISTORY:  HLD (hyperlipidemia)  HTN (hypertension)  Diabetes  H/O prior ablation treatment  S/P TAVR (transcatheter aortic valve replacement)    Vital Signs Last 24 Hrs  T(C): 36.4 (29 Jan 2019 12:00), Max: 37.1 (28 Jan 2019 20:05)  T(F): 97.5 (29 Jan 2019 12:00), Max: 98.7 (28 Jan 2019 20:05)  HR: 68 (29 Jan 2019 12:00) (58 - 76)  BP: 130/65 (29 Jan 2019 12:00) (104/60 - 149/65)  BP(mean): 88 (29 Jan 2019 12:00) (76 - 94)  RR: 12 (29 Jan 2019 12:00) (12 - 24)  SpO2: 91% (29 Jan 2019 12:00) (91% - 100%)             patient seen and examined independently on rounds in CCU, chart reviewed and discussed with the ccu resident and agree with the above resident progress note with the following addendum:    today with desaturation- down to 84% on RA- now on o2 suppl via nc- patient appears very anxious/exacerbated and states he wants to go home- but I also spoke with son silvio on phone and explained patient needs to stay in hospital for diuresis and treatment of hypoxia-- patient is oob in chair but has not ambulated-     PE:  GEN-NAD, AAOx3, anxious  PULM- fair air entry with decreased bs bilateral bases  CVS- +s1/s2 RRR- +Left ACW device site c/d/i  GI- soft obese NT ND  EXT-1+ edema bilateral legs               10.4   7.17  )-----------( 150      ( 29 Jan 2019 04:10 )             33.6     01-29    135  |  93<L>  |  33<H>  ----------------------------<  127<H>  4.1   |  27  |  1.5    Ca    8.9      29 Jan 2019 04:10  Mg     2.2     01-29    TPro  6.7  /  Alb  3.3<L>  /  TBili  1.7<H>  /  DBili  x   /  AST  24  /  ALT  22  /  AlkPhos  87  01-29              MEDICATIONS  (STANDING):  acetaminophen   Tablet .. 975 milliGRAM(s) Oral once  atorvastatin 40 milliGRAM(s) Oral at bedtime  buDESOnide 160 MICROgram(s)/formoterol 4.5 MICROgram(s) Inhaler 2 Puff(s) Inhalation two times a day  cephalexin 500 milliGRAM(s) Oral every 12 hours  chlorhexidine 4% Liquid 1 Application(s) Topical <User Schedule>  dextrose 5%. 1000 milliLiter(s) (50 mL/Hr) IV Continuous <Continuous>  dextrose 50% Injectable 12.5 Gram(s) IV Push once  dextrose 50% Injectable 25 Gram(s) IV Push once  dextrose 50% Injectable 25 Gram(s) IV Push once  furosemide   Injectable 20 milliGRAM(s) IV Push once  furosemide   Injectable 40 milliGRAM(s) IV Push once  insulin glargine Injectable (LANTUS) 21 Unit(s) SubCutaneous at bedtime  insulin lispro (HumaLOG) corrective regimen sliding scale   SubCutaneous three times a day before meals  insulin lispro Injectable (HumaLOG) 8 Unit(s) SubCutaneous three times a day before meals  montelukast 10 milliGRAM(s) Oral daily  tamsulosin 0.4 milliGRAM(s) Oral at bedtime  vancomycin  IVPB 1500 milliGRAM(s) IV Intermittent every 12 hours

## 2019-01-29 NOTE — PROGRESS NOTE ADULT - ASSESSMENT
a/p:    #syncope--2/2  sick sinus syndrome/bradycardia- h/o AF s/p ablation  -CT head negative- trauma w/u no fracture  -s/p ppm 1/28/19  -f/u with EPS and device interrogation today  -echo with normal LVEF, grade II diastolic dysfunction, TAVR in place, mild to moderate MR, mild to moderate TR, PASP 54 mmHg, moderate Pulm HTN  -cont amiodarone and bblocker---discontinue dig    #ANGELINA on CKD  -improving cr down from 3.3--->1.5  -hypokalemia resolved  -monitor bun/cr and electrolytes  -nephrology following    #acute hypoxic respiratory failure- with fluid overload on cxr  -lasix 40 mg iv today--monitor i/o and daily weight  -monitor o2 sat  -cont o2 suppl and neb  -avoid over diuresis and worsening of ANGELINA  -am cxr  -pulm f/u as outpatient---will need sleep study and pft  -check ABG  -check pulse ox off o2 and with ambulation    #DM2  -cont lantus/humalog  -monitor fs  -diabetic diet    #DVT/GI ppx  full code  downgrade to telemetry if ok with cardiology team---anticipate veronica walker home in next 24-48 hrs (appears to be complicated family concerns---lives with son but is estranged from him)--pcp is Dr. Sullivan who he says he will f/u with in few days  a;lso explained to patient that if he refuses to stay in hospital would need to sign out against medical advice and take risk of complications including but not limited to death, mi, respiratory failure in his own hands---he is ok with staying at this time- but did have emotional outburst while on phone with son  -SW/CM following

## 2019-01-29 NOTE — PROGRESS NOTE ADULT - ASSESSMENT
68M with PMH of aortic stenosis s/p TAVR, CHF, CKD, atrial fibrillation s/p ablation, DM, BPH, DLD, and COPD who presented 1/25 w/ syncope found w/ symptomatic bradycardia.     Baseline renal function unknown, he reports hx of CKD at least > yr  May have ANGELINA in setting of acute event, Cr is downtrending (not on fluids nor diuretics) though is vol up at some point will need diuretics  ·	ANGELINA on CKD creat trending down   ·	Non oliguric   ·	if UO decreases consider lasix IV   ·	Trend Cr, outpt labs would be most helpful though now Creat at 1.5   ·	Neg hematuria and albuminuria, unlikely Glomerular process, though send Prot/Cr ratio given his edema (UA checks albumin only)   ·	sp PPM       will sign off recall PRN as needed

## 2019-01-29 NOTE — PROGRESS NOTE ADULT - ASSESSMENT
Jack Sweet is a 68M with PMH of aortic stenosis s/p TAVR, CHF, CKD, atrial fibrillation s/p ablation, DM, BPH, DLD, and COPD who is admitted to CCU with symptomatic bradycardia.     #Syncopal episode 2/2 bradycardia 2/2 sick sinus syndrome   -Patient currently down for PPM placement 1/28  -Can resume amiodarone, metoprolol  -Hold digoxin  -Trend cardiac enzymes, monitor on telemetry and with daily EKGs  -TSH 1.48 on 1/26/19 (WNL)  -Generalized and focal slowing on EEG, no seizure activity   -EPS on board (Dr. Mauro)   -Xarelto to resume 1/31    #Hypoxemia at rest, likely secondary to COPD/asthma  -C/w Symbicort, Spiriva  -F/u CXR, ABG  -F/u w/ Case Management for outpatient O2 if indicated    #ANGELINA on CKD   -Cr 1.5 <-- 1.8 <-- 2.1 <-- 2.8 <-- 3.3 (on admission)  -F/u Renal US (never done; reordered 1/28)  -F/u urine protein/cr ratio  -Monitor electrolytes, UOP     #Macrocytic Anemia  -Hb 10.4 <-- 10.9 <-- 11.1 <--11.4  -B12 > 2,000; Folate > 20  -Monitor CBC     #Paroxysmal Atrial Fibrillation s/p ablation  -CHADSVASC 3  -Xarelto to resume 1/31  -Resume amiodarone 200 mg qD, metoprolol succinate 100 mg qD    # Hypokalemia s/p PO repletion 1/28  F/u AM BMP    #DM  -A1C 7.4  -Adjust insulin PRN to achieve BG < 180   -Monitor FS, carbohydrate consistent diet    #BPH  -Continue tamsulosin     #DLD  -Continue statin     #COPD  -Continue Symbicort and Singulair     #DVT ppx: Resume Xarelto 1/31  #GI prophylaxis: not indicated   #Diet: CC   #Activity: increase as tolerated   FULL CODE   Stable for d/g to Tele.

## 2019-01-29 NOTE — CHART NOTE - NSCHARTNOTEFT_GEN_A_CORE
CCU Transfer Note    Transfer from: CCU  Transfer to:  (  ) Medicine    ( x ) Telemetry    (  ) RCU    (  ) Palliative    (  ) Stroke Unit    (  ) _______________  Accepting physican:      CCU COURSE:    Reason for Admission: fall, LOC  History of Present Illness:   68 year old M PMH HTN. HLD, and DM, CHF, BPH, DLD, asthma/COPD, atrial fibrillation S/P ablation 9 months ago, valvular disorder S/P TAVR 9 months ago brought in by EMS after being found unresponsive on floor with urine and fecal incontinence and unwitnessed fall down 5 steps.  Patient was found at the bottom of 5 steps at his doctor's office, patient states he has no recollection of the fall.  Patient is unsure if he sustained head trauma, denies LOC and denies AC use. Patient mentioned that for the last week he was feeling dizzy and having unsteady gait from dizziness, she checked his pulse and it was low, sometimes reaching 40 , and his blood pressure was intermittently low. Patient mentioned he had another syncope episode around 2 month ago, he had was driving , felt dizzy, he has to pull over then he lost his consciousness and did not remember what happen. Patient  denies any chest pain during the last week, no SOB denied , nausea, vomiting, abdominal pain.  Patient was very confused on how the fall happened.  Patient was trauma alert in ED . In Ed patient was found in sinus bradycardia , and was given atropin in ED.  To note , patient was on bumetanide and it was stopped by his nephrologist for dehydration and josé luis during the week prior to admission.              Vital Signs Last 24 Hrs  T(C): 36.4 (29 Jan 2019 12:00), Max: 37.1 (28 Jan 2019 20:05)  T(F): 97.5 (29 Jan 2019 12:00), Max: 98.7 (28 Jan 2019 20:05)  HR: 72 (29 Jan 2019 14:00) (58 - 76)  BP: 136/63 (29 Jan 2019 14:00) (104/60 - 149/65)  BP(mean): 88 (29 Jan 2019 14:00) (76 - 94)  RR: 16 (29 Jan 2019 14:00) (12 - 24)  SpO2: 96% (29 Jan 2019 14:00) (91% - 100%)  I&O's Summary    28 Jan 2019 07:01  -  29 Jan 2019 07:00  --------------------------------------------------------  IN: 1160 mL / OUT: 2175 mL / NET: -1015 mL    29 Jan 2019 07:01  -  29 Jan 2019 15:15  --------------------------------------------------------  IN: 540 mL / OUT: 2425 mL / NET: -1885 mL        MEDICATIONS  (STANDING):  atorvastatin 40 milliGRAM(s) Oral at bedtime  buDESOnide 160 MICROgram(s)/formoterol 4.5 MICROgram(s) Inhaler 2 Puff(s) Inhalation two times a day  cephalexin 500 milliGRAM(s) Oral every 12 hours  chlorhexidine 4% Liquid 1 Application(s) Topical <User Schedule>  dextrose 5%. 1000 milliLiter(s) (50 mL/Hr) IV Continuous <Continuous>  dextrose 50% Injectable 12.5 Gram(s) IV Push once  dextrose 50% Injectable 25 Gram(s) IV Push once  dextrose 50% Injectable 25 Gram(s) IV Push once  insulin glargine Injectable (LANTUS) 21 Unit(s) SubCutaneous at bedtime  insulin lispro (HumaLOG) corrective regimen sliding scale   SubCutaneous three times a day before meals  insulin lispro Injectable (HumaLOG) 8 Unit(s) SubCutaneous three times a day before meals  montelukast 10 milliGRAM(s) Oral daily  tamsulosin 0.4 milliGRAM(s) Oral at bedtime    MEDICATIONS  (PRN):  dextrose 40% Gel 15 Gram(s) Oral once PRN Blood Glucose LESS THAN 70 milliGRAM(s)/deciliter  glucagon  Injectable 1 milliGRAM(s) IntraMuscular once PRN Glucose LESS THAN 70 milligrams/deciliter  HYDROmorphone  Injectable 0.25 milliGRAM(s) IV Push every 10 minutes PRN Moderate Pain (4 - 6)  ondansetron Injectable 4 milliGRAM(s) IV Push every 4 hours PRN Nausea and/or Vomiting      LABS                                            10.4                  Neurophils% (auto):   76.4   (01-29 @ 04:10):    7.17 )-----------(150          Lymphocytes% (auto):  9.1                                           33.6                   Eosinphils% (auto):   1.8      Manual%: Neutrophils x    ; Lymphocytes x    ; Eosinophils x    ; Bands%: x    ; Blasts x                                    135    |  93     |  33                  Calcium: 8.9   / iCa: x      (01-29 @ 04:10)    ----------------------------<  127       Magnesium: 2.2                              4.1     |  27     |  1.5              Phosphorous: x        TPro  6.7    /  Alb  3.3    /  TBili  1.7    /  DBili  x      /  AST  24     /  ALT  22     /  AlkPhos  87     29 Jan 2019 04:10      ASSESSMENT & PLAN:         For Follow-Up: CCU Transfer Note    Transfer from: CCU  Transfer to:  (  ) Medicine    ( x ) Telemetry    (  ) RCU    (  ) Palliative    (  ) Stroke Unit   Accepting physican:      CCU COURSE:    Reason for Admission: fall, LOC  History of Present Illness:   68 year old M PMH HTN. HLD, and DM, CHF, BPH, DLD, asthma/COPD, atrial fibrillation S/P ablation 9 months ago, valvular disorder S/P TAVR 9 months ago brought in by EMS after being found unresponsive on floor with urine and fecal incontinence and unwitnessed fall down 5 steps.  Patient was found at the bottom of 5 steps at his doctor's office, patient states he has no recollection of the fall.  Patient is unsure if he sustained head trauma, denies LOC and denies AC use. Patient mentioned that for the last week he was feeling dizzy and having unsteady gait from dizziness, she checked his pulse and it was low, sometimes reaching 40 , and his blood pressure was intermittently low. Patient mentioned he had another syncope episode around 2 month ago, he had was driving , felt dizzy, he has to pull over then he lost his consciousness and did not remember what happen. Patient  denies any chest pain during the last week, no SOB denied , nausea, vomiting, abdominal pain.  Patient was very confused on how the fall happened.  Patient was trauma alert in ED . In Ed patient was found in sinus bradycardia , and was given atropin in ED.  To note , patient was on bumetanide and it was stopped by his nephrologist for dehydration and angelina during the week prior to admission.    Patient's home dose amiodarone, metoprolol and digoxin held. Xarelto held prior to PPM placed Monday 1/28; procedure went without incident.     The patient was found to be desatting to low-mid 80s at rest on 1/29; improved w/ 2L NC. Was given Lasix 40 mg IV x 1. Per patient, was not taking home O2 at home and was not following up with his outpatient cardiologist (Dr. Sullivan) who had prescribed him Symbicort and Spiriva. States he is not aware of any history of COPD.     The patient was stable for d/g to Telemetry 1/29.          Vital Signs Last 24 Hrs  T(C): 36.4 (29 Jan 2019 12:00), Max: 37.1 (28 Jan 2019 20:05)  T(F): 97.5 (29 Jan 2019 12:00), Max: 98.7 (28 Jan 2019 20:05)  HR: 72 (29 Jan 2019 14:00) (58 - 76)  BP: 136/63 (29 Jan 2019 14:00) (104/60 - 149/65)  BP(mean): 88 (29 Jan 2019 14:00) (76 - 94)  RR: 16 (29 Jan 2019 14:00) (12 - 24)  SpO2: 96% (29 Jan 2019 14:00) (91% - 100%)  I&O's Summary    28 Jan 2019 07:01  -  29 Jan 2019 07:00  --------------------------------------------------------  IN: 1160 mL / OUT: 2175 mL / NET: -1015 mL    29 Jan 2019 07:01  -  29 Jan 2019 15:15  --------------------------------------------------------  IN: 540 mL / OUT: 2425 mL / NET: -1885 mL        MEDICATIONS  (STANDING):  atorvastatin 40 milliGRAM(s) Oral at bedtime  buDESOnide 160 MICROgram(s)/formoterol 4.5 MICROgram(s) Inhaler 2 Puff(s) Inhalation two times a day  cephalexin 500 milliGRAM(s) Oral every 12 hours  chlorhexidine 4% Liquid 1 Application(s) Topical <User Schedule>  dextrose 5%. 1000 milliLiter(s) (50 mL/Hr) IV Continuous <Continuous>  dextrose 50% Injectable 12.5 Gram(s) IV Push once  dextrose 50% Injectable 25 Gram(s) IV Push once  insulin glargine Injectable (LANTUS) 21 Unit(s) SubCutaneous at bedtime  insulin lispro (HumaLOG) corrective regimen sliding scale   SubCutaneous three times a day before meals  insulin lispro Injectable (HumaLOG) 8 Unit(s) SubCutaneous three times a day before meals  montelukast 10 milliGRAM(s) Oral daily  tamsulosin 0.4 milliGRAM(s) Oral at bedtime    MEDICATIONS  (PRN):  dextrose 40% Gel 15 Gram(s) Oral once PRN Blood Glucose LESS THAN 70 milliGRAM(s)/deciliter  glucagon  Injectable 1 milliGRAM(s) IntraMuscular once PRN Glucose LESS THAN 70 milligrams/deciliter  HYDROmorphone  Injectable 0.25 milliGRAM(s) IV Push every 10 minutes PRN Moderate Pain (4 - 6)  ondansetron Injectable 4 milliGRAM(s) IV Push every 4 hours PRN Nausea and/or Vomiting      LABS                                            10.4                  Neurophils% (auto):   76.4   (01-29 @ 04:10):    7.17 )-----------(150          Lymphocytes% (auto):  9.1                                           33.6                   Eosinphils% (auto):   1.8      Manual%: Neutrophils x    ; Lymphocytes x    ; Eosinophils x    ; Bands%: x    ; Blasts x                                    135    |  93     |  33                  Calcium: 8.9   / iCa: x      (01-29 @ 04:10)    ----------------------------<  127       Magnesium: 2.2                              4.1     |  27     |  1.5              Phosphorous: x        TPro  6.7    /  Alb  3.3    /  TBili  1.7    /  DBili  x      /  AST  24     /  ALT  22     /  AlkPhos  87     29 Jan 2019 04:10      ASSESSMENT & PLAN:       Jack Sweet is a 68M with PMH of aortic stenosis s/p TAVR, CHF, CKD, atrial fibrillation s/p ablation, DM, BPH, DLD, and COPD who is admitted to CCU with symptomatic bradycardia.     #Syncopal episode 2/2 bradycardia 2/2 sick sinus syndrome   -Patient currently down for PPM placement 1/28  -Can resume amiodarone, metoprolol  -Hold digoxin  -Trend cardiac enzymes, monitor on telemetry and with daily EKGs  -TSH 1.48 on 1/26/19 (WNL)  -Generalized and focal slowing on EEG, no seizure activity   -EPS on board (Dr. Mauro)   -Xarelto to resume 1/31    #Hypoxemia at rest, likely secondary to COPD/asthma  -C/w Symbicort, Spiriva  -F/u CXR, ABG  -F/u w/ Case Management for outpatient O2 if indicated    #ANGELINA on CKD   -Cr 1.5 <-- 1.8 <-- 2.1 <-- 2.8 <-- 3.3 (on admission)  -F/u Renal US (never done; reordered 1/28)  -F/u urine protein/cr ratio  -Monitor electrolytes, UOP     #Macrocytic Anemia  -Hb 10.4 <-- 10.9 <-- 11.1 <--11.4  -B12 > 2,000; Folate > 20  -Monitor CBC     #Paroxysmal Atrial Fibrillation s/p ablation  -CHADSVASC 3  -Xarelto to resume 1/31    # Hypokalemia s/p PO repletion 1/28  F/u AM BMP    #DM  -A1C 7.4  -Adjust insulin PRN to achieve BG < 180   -Monitor FS, carbohydrate consistent diet    #BPH  -Continue tamsulosin     #DLD  -Continue statin     #COPD  -Continue Symbicort and Singulair     #DVT ppx: Resume Xarelto 1/31  #GI prophylaxis: not indicated   #Diet: CC   #Activity: increase as tolerated   FULL CODE   Stable for d/g to Tele CCU Transfer Note    Transfer from: CCU  Transfer to:  (  ) Medicine    ( x ) Telemetry    (  ) RCU    (  ) Palliative    (  ) Stroke Unit   Accepting physican:      CCU COURSE:    Reason for Admission: fall, LOC  History of Present Illness:   68 year old M PMH HTN. HLD, and DM, CHF, BPH, DLD, asthma/COPD, atrial fibrillation S/P ablation 9 months ago, valvular disorder S/P TAVR 9 months ago brought in by EMS after being found unresponsive on floor with urine and fecal incontinence and unwitnessed fall down 5 steps.  Patient was found at the bottom of 5 steps at his doctor's office, patient states he has no recollection of the fall.  Patient is unsure if he sustained head trauma, denies LOC and denies AC use. Patient mentioned that for the last week he was feeling dizzy and having unsteady gait from dizziness, she checked his pulse and it was low, sometimes reaching 40 , and his blood pressure was intermittently low. Patient mentioned he had another syncope episode around 2 month ago, he had was driving , felt dizzy, he has to pull over then he lost his consciousness and did not remember what happen. Patient  denies any chest pain during the last week, no SOB denied , nausea, vomiting, abdominal pain.  Patient was very confused on how the fall happened.  Patient was trauma alert in ED . In Ed patient was found in sinus bradycardia , and was given atropin in ED.  To note , patient was on bumetanide and it was stopped by his nephrologist for dehydration and angelina during the week prior to admission.    Patient's home dose amiodarone, metoprolol and digoxin held. Xarelto held prior to PPM placed Monday 1/28; procedure went without incident.     The patient was found to be desatting to low-mid 80s at rest on 1/29; improved w/ 2L NC. Was given Lasix 40 mg IV x 1. Per patient, was not taking home O2 at home and was not following up with his outpatient cardiologist (Dr. Sullivan) who had prescribed him Symbicort and Spiriva. States he is not aware of any history of COPD.     The patient was stable for d/g to Telemetry 1/29.          Vital Signs Last 24 Hrs  T(C): 36.4 (29 Jan 2019 12:00), Max: 37.1 (28 Jan 2019 20:05)  T(F): 97.5 (29 Jan 2019 12:00), Max: 98.7 (28 Jan 2019 20:05)  HR: 72 (29 Jan 2019 14:00) (58 - 76)  BP: 136/63 (29 Jan 2019 14:00) (104/60 - 149/65)  BP(mean): 88 (29 Jan 2019 14:00) (76 - 94)  RR: 16 (29 Jan 2019 14:00) (12 - 24)  SpO2: 96% (29 Jan 2019 14:00) (91% - 100%)  I&O's Summary    28 Jan 2019 07:01  -  29 Jan 2019 07:00  --------------------------------------------------------  IN: 1160 mL / OUT: 2175 mL / NET: -1015 mL    29 Jan 2019 07:01  -  29 Jan 2019 15:15  --------------------------------------------------------  IN: 540 mL / OUT: 2425 mL / NET: -1885 mL        MEDICATIONS  (STANDING):  atorvastatin 40 milliGRAM(s) Oral at bedtime  buDESOnide 160 MICROgram(s)/formoterol 4.5 MICROgram(s) Inhaler 2 Puff(s) Inhalation two times a day  cephalexin 500 milliGRAM(s) Oral every 12 hours  chlorhexidine 4% Liquid 1 Application(s) Topical <User Schedule>  dextrose 5%. 1000 milliLiter(s) (50 mL/Hr) IV Continuous <Continuous>  dextrose 50% Injectable 12.5 Gram(s) IV Push once  dextrose 50% Injectable 25 Gram(s) IV Push once  insulin glargine Injectable (LANTUS) 21 Unit(s) SubCutaneous at bedtime  insulin lispro (HumaLOG) corrective regimen sliding scale   SubCutaneous three times a day before meals  insulin lispro Injectable (HumaLOG) 8 Unit(s) SubCutaneous three times a day before meals  montelukast 10 milliGRAM(s) Oral daily  tamsulosin 0.4 milliGRAM(s) Oral at bedtime    MEDICATIONS  (PRN):  dextrose 40% Gel 15 Gram(s) Oral once PRN Blood Glucose LESS THAN 70 milliGRAM(s)/deciliter  glucagon  Injectable 1 milliGRAM(s) IntraMuscular once PRN Glucose LESS THAN 70 milligrams/deciliter  HYDROmorphone  Injectable 0.25 milliGRAM(s) IV Push every 10 minutes PRN Moderate Pain (4 - 6)  ondansetron Injectable 4 milliGRAM(s) IV Push every 4 hours PRN Nausea and/or Vomiting      LABS                                            10.4                  Neurophils% (auto):   76.4   (01-29 @ 04:10):    7.17 )-----------(150          Lymphocytes% (auto):  9.1                                           33.6                   Eosinphils% (auto):   1.8      Manual%: Neutrophils x    ; Lymphocytes x    ; Eosinophils x    ; Bands%: x    ; Blasts x                                    135    |  93     |  33                  Calcium: 8.9   / iCa: x      (01-29 @ 04:10)    ----------------------------<  127       Magnesium: 2.2                              4.1     |  27     |  1.5              Phosphorous: x        TPro  6.7    /  Alb  3.3    /  TBili  1.7    /  DBili  x      /  AST  24     /  ALT  22     /  AlkPhos  87     29 Jan 2019 04:10      ASSESSMENT & PLAN:       Jack Sweet is a 68M with PMH of aortic stenosis s/p TAVR, CHF, CKD, atrial fibrillation s/p ablation, DM, BPH, DLD, and COPD who is admitted to CCU with symptomatic bradycardia.     #Syncopal episode 2/2 bradycardia 2/2 sick sinus syndrome   -Patient currently down for PPM placement 1/28  -Can resume amiodarone, metoprolol  -Hold digoxin  -Trend cardiac enzymes, monitor on telemetry and with daily EKGs  -TSH 1.48 on 1/26/19 (WNL)  -Generalized and focal slowing on EEG, no seizure activity   -EPS on board (Dr. Mauro)   -Xarelto to resume 1/31    #Hypoxemia at rest, likely secondary to COPD/asthma  -C/w Symbicort, Spiriva  -F/u CXR, ABG  -F/u w/ Case Management for outpatient O2 if indicated    #ANGELINA on CKD   -Cr 1.5 <-- 1.8 <-- 2.1 <-- 2.8 <-- 3.3 (on admission)  -F/u Renal US (never done; reordered 1/28)  -F/u urine protein/cr ratio  -Monitor electrolytes, UOP     #Macrocytic Anemia  -Hb 10.4 <-- 10.9 <-- 11.1 <--11.4  -B12 > 2,000; Folate > 20  -Monitor CBC     #Paroxysmal Atrial Fibrillation s/p ablation  -CHADSVASC 3  -Xarelto to resume 1/31  -Resume amiodarone 200 mg qD, metoprolol succinate 100 mg qD    # Hypokalemia s/p PO repletion 1/28  F/u AM BMP    #DM  -A1C 7.4  -Adjust insulin PRN to achieve BG < 180   -Monitor FS, carbohydrate consistent diet    #BPH  -Continue tamsulosin     #DLD  -Continue statin     #COPD  -Continue Symbicort and Singulair     #DVT ppx: Resume Xarelto 1/31  #GI prophylaxis: not indicated   #Diet: CC   #Activity: increase as tolerated   FULL CODE   Stable for d/g to Tele

## 2019-01-29 NOTE — PROGRESS NOTE ADULT - ASSESSMENT
Assessment: Pt is a 69 yo M with PMH of aortic stenosis s/p TAVR, CHF, CKD, atrial fibrillation s/p ablation, DM, BPH, DLD, and COPD who is admitted to CCU with symptomatic bradycardia and falls. Pt SP DC PPM implant and loop recorder explant x 1 day. Pt admits to soreness around the incision but otherwise denies chest pain, palpitations, dizziness or SOB.    Plan:  Symptomatic Bradycardia SP DC PPM and LR explant  - Keflex 500 mg BID x 5 days  - No lifting of L arm above 90 degrees for 1 month  - Interrogation pending  - Follow up with Dr Mauro in 3-4 weeks

## 2019-01-29 NOTE — PROGRESS NOTE ADULT - SUBJECTIVE AND OBJECTIVE BOX
PASTOR NOONAN 68y Male  MRN#: [id number]       SUBJECTIVE  Patient is a 68y old Male who presents with a chief complaint of fall, LOC (29 Jan 2019 14:09)  Currently admitted to medicine with the primary diagnosis of syncope 2/2 bradycardia.    The patient in the AM was anxious regarding specifics of discharge; was concerned his son might not bring him home today. I explained to him that due to his desatting at rest, he would likely stay for another day as a d/g to [facility] w/ subsequent discharge planning. Patient is agreeable to plan.      OBJECTIVE  PAST MEDICAL & SURGICAL HISTORY  HLD (hyperlipidemia)  HTN (hypertension)  Diabetes  H/O prior ablation treatment  S/P TAVR (transcatheter aortic valve replacement)    ALLERGIES:  codeine (Unknown)    MEDICATIONS:  STANDING MEDICATIONS  amiodarone    Tablet 200 milliGRAM(s) Oral daily  atorvastatin 40 milliGRAM(s) Oral at bedtime  buDESOnide 160 MICROgram(s)/formoterol 4.5 MICROgram(s) Inhaler 2 Puff(s) Inhalation two times a day  cephalexin 500 milliGRAM(s) Oral every 12 hours  chlorhexidine 4% Liquid 1 Application(s) Topical <User Schedule>  dextrose 5%. 1000 milliLiter(s) IV Continuous <Continuous>  dextrose 50% Injectable 12.5 Gram(s) IV Push once  dextrose 50% Injectable 25 Gram(s) IV Push once  insulin glargine Injectable (LANTUS) 21 Unit(s) SubCutaneous at bedtime  insulin lispro (HumaLOG) corrective regimen sliding scale   SubCutaneous three times a day before meals  insulin lispro Injectable (HumaLOG) 8 Unit(s) SubCutaneous three times a day before meals  metoprolol succinate  milliGRAM(s) Oral daily  montelukast 10 milliGRAM(s) Oral daily  tamsulosin 0.4 milliGRAM(s) Oral at bedtime    PRN MEDICATIONS  dextrose 40% Gel 15 Gram(s) Oral once PRN  glucagon  Injectable 1 milliGRAM(s) IntraMuscular once PRN  HYDROmorphone  Injectable 0.25 milliGRAM(s) IV Push every 10 minutes PRN  ondansetron Injectable 4 milliGRAM(s) IV Push every 4 hours PRN      VITAL SIGNS: Last 24 Hours  T(C): 36.4 (29 Jan 2019 12:00), Max: 37.1 (28 Jan 2019 20:05)  T(F): 97.5 (29 Jan 2019 12:00), Max: 98.7 (28 Jan 2019 20:05)  HR: 72 (29 Jan 2019 14:00) (58 - 76)  BP: 136/63 (29 Jan 2019 14:00) (110/67 - 149/65)  BP(mean): 88 (29 Jan 2019 14:00) (76 - 94)  RR: 16 (29 Jan 2019 14:00) (12 - 24)  SpO2: 96% (29 Jan 2019 14:00) (91% - 100%)    LABS:                        10.4   7.17  )-----------( 150      ( 29 Jan 2019 04:10 )             33.6     01-29    135  |  93<L>  |  33<H>  ----------------------------<  127<H>  4.1   |  27  |  1.5    Ca    8.9      29 Jan 2019 04:10  Mg     2.2     01-29    TPro  6.7  /  Alb  3.3<L>  /  TBili  1.7<H>  /  DBili  x   /  AST  24  /  ALT  22  /  AlkPhos  87  01-29    RADIOLOGY:    < from: Xray Chest 2 Views PA/Lat (01.29.19 @ 08:42) >  Findings:    Support devices: Left side dual-chamber pacemaker    Cardiac/mediastinum/hilum: Status post TAVR. There is a left atrial   appendage closure device.    Lung parenchyma/Pleura: There is no evidence of consolidation, effusion   or pneumothorax.    Skeleton/soft tissues: Unchanged    Impression:      No radiographic evidence of acute cardiopulmonary disease.    PHYSICAL EXAM:    GENERAL: NAD, well-developed, AAOx3  HEENT:  Atraumatic, Normocephalic. EOMI, PERRLA, conjunctiva and sclerae white  PULMONARY: Clear to auscultation bilaterally; No wheeze  CARDIOVASCULAR: Regular rate and rhythm; No murmurs, rubs, or gallops  GASTROINTESTINAL: Soft, Nontender, Nondistended  MUSCULOSKELETAL: No clubbing, cyanosis, or edema  NEUROLOGY: non-focal  SKIN: No rashes or lesions

## 2019-01-29 NOTE — PROGRESS NOTE ADULT - SUBJECTIVE AND OBJECTIVE BOX
INTERVAL HPI/OVERNIGHT EVENTS: No acute events overnight; Pt c/o soreness to chest but no chest pain, palpitations, dizziness or SOB.    MEDICATIONS  (STANDING):  acetaminophen   Tablet .. 975 milliGRAM(s) Oral once  atorvastatin 40 milliGRAM(s) Oral at bedtime  buDESOnide 160 MICROgram(s)/formoterol 4.5 MICROgram(s) Inhaler 2 Puff(s) Inhalation two times a day  chlorhexidine 4% Liquid 1 Application(s) Topical <User Schedule>  dextrose 5%. 1000 milliLiter(s) (50 mL/Hr) IV Continuous <Continuous>  dextrose 50% Injectable 12.5 Gram(s) IV Push once  dextrose 50% Injectable 25 Gram(s) IV Push once  dextrose 50% Injectable 25 Gram(s) IV Push once  insulin glargine Injectable (LANTUS) 21 Unit(s) SubCutaneous at bedtime  insulin lispro (HumaLOG) corrective regimen sliding scale   SubCutaneous three times a day before meals  insulin lispro Injectable (HumaLOG) 8 Unit(s) SubCutaneous three times a day before meals  montelukast 10 milliGRAM(s) Oral daily  tamsulosin 0.4 milliGRAM(s) Oral at bedtime  vancomycin  IVPB 1500 milliGRAM(s) IV Intermittent every 12 hours    MEDICATIONS  (PRN):  dextrose 40% Gel 15 Gram(s) Oral once PRN Blood Glucose LESS THAN 70 milliGRAM(s)/deciliter  glucagon  Injectable 1 milliGRAM(s) IntraMuscular once PRN Glucose LESS THAN 70 milligrams/deciliter  HYDROmorphone  Injectable 0.25 milliGRAM(s) IV Push every 10 minutes PRN Moderate Pain (4 - 6)  ondansetron Injectable 4 milliGRAM(s) IV Push every 4 hours PRN Nausea and/or Vomiting      Allergies    codeine (Unknown)    REVIEW OF SYSTEMS: Denies chest pain, palpitations, dizziness or SOB.    Vital Signs Last 24 Hrs  T(C): 36.6 (2019 08:00), Max: 37.1 (2019 20:05)  T(F): 97.9 (2019 08:00), Max: 98.7 (2019 20:05)  HR: 66 (2019 08:00) (56 - 76)  BP: 133/64 (2019 08:00) (97/46 - 149/65)  BP(mean): 90 (2019 08:00) (76 - 94)  RR: 18 (2019 08:00) (12 - 24)  SpO2: 94% (2019 08:00) (93% - 100%)    19 @ 07:01  -  19 @ 07:00  --------------------------------------------------------  IN: 1160 mL / OUT: 2175 mL / NET: -1015 mL        Physical Exam  GENERAL: In no apparent distress, well nourished, and hydrated.  EYES: EOMI, PERRLA, conjunctiva and sclera clear  HEART: Regular rate and rhythm; No murmurs, rubs, or gallops.  PULMONARY: Clear to auscultation and perfusion.  No rales, wheezing, or rhonchi bilaterally.  ABDOMEN: Soft, Nontender, Nondistended; Bowel sounds present  EXTREMITIES:  2+ Peripheral Pulses, No clubbing, cyanosis, or edema  SKIN: Wound healing well, no hematoma or sign of infection. Steri strips open to air.    LABS:                        10.4   7.17  )-----------( 150      ( 2019 04:10 )             33.6         135  |  93<L>  |  33<H>  ----------------------------<  127<H>  4.1   |  27  |  1.5    Ca    8.9      2019 04:10  Mg     2.2         TPro  6.7  /  Alb  3.3<L>  /  TBili  1.7<H>  /  DBili  x   /  AST  24  /  ALT  22  /  AlkPhos  87        TELE: A-Paced Sinus rhythm, HR 60s-70s    RADIOLOGY & ADDITIONAL TESTS:  12 Lead ECG (19 @ 07:26)  Ventricular Rate 67 BPM    Atrial Rate 68 BPM    QRS Duration 100 ms    Q-T Interval 424 ms    QTC Calculation(Bezet) 448 ms    R Axis -22 degrees    T Axis 93 degrees    Diagnosis Line Sinus rhythm  Abnormal QRS-T angle, consider primary T wave abnormality  Abnormal ECG    Confirmed by JASPER SALAZAR MD ([id number]) on 2019 9:26:40 AM     Xray Chest 1 View AP/PA (19 @ 15:34)   EXAM:  XR CHEST FRONTAL 1V            PROCEDURE DATE:  2019      INTERPRETATION:  Clinical History / Reason for exam: Postoperative.    Comparison : Chest radiograph 2019 at 30 2:00 AM.    Technique/Positionin AP radiographs ofthe chest. Rotated exam.    Findings:    Support devices: Interval placement of left-sided dual lead cardiac   pacemaker with lead tip terminating in the right atrium and right   ventricle. Interval removal of loop recorder.    Cardiac/mediastinum/hilum: Stable enlarged cardiac silhouette. Post TVAR.   Left atrial appendage occlusion device.    Lung parenchyma/Pleura: Stable mild pulmonary vascular congestion. No   focal consolidation or pneumothorax.    Skeleton/soft tissues: Stable.    Impression:      New left pacemaker. No pneumothorax.    Persistent pulmonary vascular congestion.    REMY WILSON M.D., RESIDENT RADIOLOGIST  This document has been electronically signed.  YENNI GRANADOS M.D., ATTENDING RADIOLOGIST  This document has been electronically signed. 2019  4:40PM INTERVAL HPI/OVERNIGHT EVENTS: No acute events overnight; Pt c/o soreness to chest but no chest pain, palpitations, dizziness or SOB.    MEDICATIONS  (STANDING):  acetaminophen   Tablet .. 975 milliGRAM(s) Oral once  atorvastatin 40 milliGRAM(s) Oral at bedtime  buDESOnide 160 MICROgram(s)/formoterol 4.5 MICROgram(s) Inhaler 2 Puff(s) Inhalation two times a day  chlorhexidine 4% Liquid 1 Application(s) Topical <User Schedule>  dextrose 5%. 1000 milliLiter(s) (50 mL/Hr) IV Continuous <Continuous>  dextrose 50% Injectable 12.5 Gram(s) IV Push once  dextrose 50% Injectable 25 Gram(s) IV Push once  dextrose 50% Injectable 25 Gram(s) IV Push once  insulin glargine Injectable (LANTUS) 21 Unit(s) SubCutaneous at bedtime  insulin lispro (HumaLOG) corrective regimen sliding scale   SubCutaneous three times a day before meals  insulin lispro Injectable (HumaLOG) 8 Unit(s) SubCutaneous three times a day before meals  montelukast 10 milliGRAM(s) Oral daily  tamsulosin 0.4 milliGRAM(s) Oral at bedtime  vancomycin  IVPB 1500 milliGRAM(s) IV Intermittent every 12 hours    MEDICATIONS  (PRN):  dextrose 40% Gel 15 Gram(s) Oral once PRN Blood Glucose LESS THAN 70 milliGRAM(s)/deciliter  glucagon  Injectable 1 milliGRAM(s) IntraMuscular once PRN Glucose LESS THAN 70 milligrams/deciliter  HYDROmorphone  Injectable 0.25 milliGRAM(s) IV Push every 10 minutes PRN Moderate Pain (4 - 6)  ondansetron Injectable 4 milliGRAM(s) IV Push every 4 hours PRN Nausea and/or Vomiting      Allergies    codeine (Unknown)    REVIEW OF SYSTEMS: Denies chest pain, palpitations, dizziness or SOB.    Vital Signs Last 24 Hrs  T(C): 36.6 (2019 08:00), Max: 37.1 (2019 20:05)  T(F): 97.9 (2019 08:00), Max: 98.7 (2019 20:05)  HR: 66 (2019 08:00) (56 - 76)  BP: 133/64 (2019 08:00) (97/46 - 149/65)  BP(mean): 90 (2019 08:00) (76 - 94)  RR: 18 (2019 08:00) (12 - 24)  SpO2: 94% (2019 08:00) (93% - 100%)    19 @ 07:01  -  19 @ 07:00  --------------------------------------------------------  IN: 1160 mL / OUT: 2175 mL / NET: -1015 mL        Physical Exam  GENERAL: In no apparent distress, well nourished, and hydrated.  EYES: EOMI, PERRLA, conjunctiva and sclera clear  HEART: Regular rate and rhythm; No murmurs, rubs, or gallops.  PULMONARY: Clear to auscultation and perfusion.  No rales, wheezing, or rhonchi bilaterally.  ABDOMEN: Soft, Nontender, Nondistended; Bowel sounds present  EXTREMITIES:  2+ Peripheral Pulses, No clubbing, cyanosis, or edema  SKIN: Wound healing well, no hematoma or sign of infection. Steri strips clean/dry/intact.    LABS:                        10.4   7.17  )-----------( 150      ( 2019 04:10 )             33.6         135  |  93<L>  |  33<H>  ----------------------------<  127<H>  4.1   |  27  |  1.5    Ca    8.9      2019 04:10  Mg     2.2         TPro  6.7  /  Alb  3.3<L>  /  TBili  1.7<H>  /  DBili  x   /  AST  24  /  ALT  22  /  AlkPhos  87        TELE: A-Paced Sinus rhythm, HR 60s-70s    RADIOLOGY & ADDITIONAL TESTS:  12 Lead ECG (19 @ 07:26)  Ventricular Rate 67 BPM    Atrial Rate 68 BPM    QRS Duration 100 ms    Q-T Interval 424 ms    QTC Calculation(Bezet) 448 ms    R Axis -22 degrees    T Axis 93 degrees    Diagnosis Line Sinus rhythm  Abnormal QRS-T angle, consider primary T wave abnormality  Abnormal ECG    Confirmed by JASPER SALAZAR MD ([id number]) on 2019 9:26:40 AM     Xray Chest 1 View AP/PA (19 @ 15:34)   EXAM:  XR CHEST FRONTAL 1V            PROCEDURE DATE:  2019      INTERPRETATION:  Clinical History / Reason for exam: Postoperative.    Comparison : Chest radiograph 2019 at 30 2:00 AM.    Technique/Positionin AP radiographs ofthe chest. Rotated exam.    Findings:    Support devices: Interval placement of left-sided dual lead cardiac   pacemaker with lead tip terminating in the right atrium and right   ventricle. Interval removal of loop recorder.    Cardiac/mediastinum/hilum: Stable enlarged cardiac silhouette. Post TVAR.   Left atrial appendage occlusion device.    Lung parenchyma/Pleura: Stable mild pulmonary vascular congestion. No   focal consolidation or pneumothorax.    Skeleton/soft tissues: Stable.    Impression:      New left pacemaker. No pneumothorax.    Persistent pulmonary vascular congestion.    REMY WILSON M.D., RESIDENT RADIOLOGIST  This document has been electronically signed.  YENNI GRANADOS M.D., ATTENDING RADIOLOGIST  This document has been electronically signed. 2019  4:40PM

## 2019-01-29 NOTE — PROGRESS NOTE ADULT - SUBJECTIVE AND OBJECTIVE BOX
Nephrology progress note    Patient is seen and examined, events over the last 24 h noted .    Allergies:  codeine (Unknown)    Hospital Medications:   MEDICATIONS  (STANDING):  atorvastatin 40 milliGRAM(s) Oral at bedtime  buDESOnide 160 MICROgram(s)/formoterol 4.5 MICROgram(s) Inhaler 2 Puff(s) Inhalation two times a day  chlorhexidine 4% Liquid 1 Application(s) Topical <User Schedule>  dextrose 5%. 1000 milliLiter(s) (50 mL/Hr) IV Continuous <Continuous>  dextrose 50% Injectable 12.5 Gram(s) IV Push once  dextrose 50% Injectable 25 Gram(s) IV Push once  dextrose 50% Injectable 25 Gram(s) IV Push once  insulin glargine Injectable (LANTUS) 21 Unit(s) SubCutaneous at bedtime  insulin lispro (HumaLOG) corrective regimen sliding scale   SubCutaneous three times a day before meals  insulin lispro Injectable (HumaLOG) 8 Unit(s) SubCutaneous three times a day before meals  montelukast 10 milliGRAM(s) Oral daily  tamsulosin 0.4 milliGRAM(s) Oral at bedtime  vancomycin  IVPB 1500 milliGRAM(s) IV Intermittent every 12 hours        VITALS:  T(F): 97.9 (19 @ 08:00), Max: 98.7 (19 @ 20:05)  HR: 66 (19 @ 08:00)  BP: 133/64 (19 @ 08:00)  RR: 18 (19 @ 08:00)  SpO2: 94% (19 @ 08:00)  Wt(kg): --     @ : @ 07:00  --------------------------------------------------------  IN: 1330 mL / OUT: 2625 mL / NET: -1295 mL     @ 07: @ 07:00  --------------------------------------------------------  IN: 1160 mL / OUT: 2175 mL / NET: -1015 mL      Height (cm): 177.8 ( @ 12:13)  Weight (kg): 151 ( @ 12:13)  BMI (kg/m2): 47.8 ( @ 12:13)  BSA (m2): 2.59 ( @ 12:13)    PHYSICAL EXAM:  Constitutional: NAD  HEENT: anicteric sclera, oropharynx clear, MMM  Neck: No JVD  Respiratory: CTAB, no wheezes, rales or rhonchi  Cardiovascular: S1, S2, RRR  Gastrointestinal: BS+, soft, NT/ND  Extremities: No cyanosis or clubbing. No peripheral edema  :  No little.   Skin: No rashes    LABS:      135  |  93<L>  |  33<H>  ----------------------------<  127<H>  4.1   |  27  |  1.5    Ca    8.9      2019 04:10  Mg     2.2         TPro  6.7  /  Alb  3.3<L>  /  TBili  1.7<H>  /  DBili      /  AST  24  /  ALT  22  /  AlkPhos  87                            10.4   7.17  )-----------( 150      ( 2019 04:10 )             33.6       Urine Studies:  Urinalysis Basic - ( 2019 17:00 )    Color: Yellow / Appearance: Clear / S.020 / pH:   Gluc:  / Ketone: Negative  / Bili: Negative / Urobili: 1.0 mg/dL   Blood:  / Protein: Negative mg/dL / Nitrite: Negative   Leuk Esterase: Negative / RBC:  / WBC    Sq Epi:  / Non Sq Epi:  / Bacteria:       Sodium, Random Urine: 56.0 mmoL/L ( @ 21:10)  Creatinine, Random Urine: 95 mg/dL ( @ 21:10)    RADIOLOGY & ADDITIONAL STUDIES: Nephrology progress note    Patient is seen and examined, events over the last 24 h noted .  feels ok no problems     Allergies:  codeine (Unknown)    Hospital Medications:   MEDICATIONS  (STANDING):  atorvastatin 40 milliGRAM(s) Oral at bedtime  buDESOnide 160 MICROgram(s)/formoterol 4.5 MICROgram(s) Inhaler 2 Puff(s) Inhalation two times a day  chlorhexidine 4% Liquid 1 Application(s) Topical <User Schedule>  insulin glargine Injectable (LANTUS) 21 Unit(s) SubCutaneous at bedtime  insulin lispro (HumaLOG) corrective regimen sliding scale   SubCutaneous three times a day before meals  insulin lispro Injectable (HumaLOG) 8 Unit(s) SubCutaneous three times a day before meals  montelukast 10 milliGRAM(s) Oral daily  tamsulosin 0.4 milliGRAM(s) Oral at bedtime  vancomycin  IVPB 1500 milliGRAM(s) IV Intermittent every 12 hours        VITALS:  T(F): 97.9 (19 @ 08:00), Max: 98.7 (19 @ 20:05)  HR: 66 (19 @ 08:00)  BP: 133/64 (19 @ 08:00)  RR: 18 (19 @ 08:00)  SpO2: 94% (19 @ 08:00)       @ 07: @ 07:00  --------------------------------------------------------  IN: 1330 mL / OUT: 2625 mL / NET: -1295 mL     @ 07: @ 07:00  --------------------------------------------------------  IN: 1160 mL / OUT: 2175 mL / NET: -1015 mL      Height (cm): 177.8 ( 12:13)  Weight (kg): 151 (01-28 @ 12:13)  BMI (kg/m2): 47.8 ( @ 12:13)  BSA (m2): 2.59 ( @ 12:13)    PHYSICAL EXAM:  Constitutional: NAD  HEENT: anicteric sclera, oropharynx clear, MMM  Neck: No JVD  Respiratory: CTAB, no wheezes, rales or rhonchi  Cardiovascular: S1, S2, RRR  Gastrointestinal: BS+, soft, NT/ND  Extremities: No cyanosis or clubbing. trace peripheral edema  :  No little.   Skin: No rashes    LABS:      135  |  93<L>  |  33<H>  ----------------------------<  127<H>  4.1   |  27  |  1.5    Ca    8.9      2019 04:10  Mg     2.2         TPro  6.7  /  Alb  3.3<L>  /  TBili  1.7<H>  /  DBili      /  AST  24  /  ALT  22  /  AlkPhos  87                            10.4   7.17  )-----------( 150      ( 2019 04:10 )             33.6       Urine Studies:  Urinalysis Basic - ( 2019 17:00 )    Color: Yellow / Appearance: Clear / S.020 / pH:   Gluc:  / Ketone: Negative  / Bili: Negative / Urobili: 1.0 mg/dL   Blood:  / Protein: Negative mg/dL / Nitrite: Negative   Leuk Esterase: Negative / RBC:  / WBC    Sq Epi:  / Non Sq Epi:  / Bacteria:       Sodium, Random Urine: 56.0 mmoL/L ( @ 21:10)  Creatinine, Random Urine: 95 mg/dL ( @ 21:10)    RADIOLOGY & ADDITIONAL STUDIES:

## 2019-01-30 LAB
ANION GAP SERPL CALC-SCNC: 17 MMOL/L — HIGH (ref 7–14)
BUN SERPL-MCNC: 24 MG/DL — HIGH (ref 10–20)
CALCIUM SERPL-MCNC: 9.3 MG/DL — SIGNIFICANT CHANGE UP (ref 8.5–10.1)
CHLORIDE SERPL-SCNC: 95 MMOL/L — LOW (ref 98–110)
CO2 SERPL-SCNC: 25 MMOL/L — SIGNIFICANT CHANGE UP (ref 17–32)
CREAT SERPL-MCNC: 1.4 MG/DL — SIGNIFICANT CHANGE UP (ref 0.7–1.5)
GLUCOSE BLDC GLUCOMTR-MCNC: 133 MG/DL — HIGH (ref 70–99)
GLUCOSE BLDC GLUCOMTR-MCNC: 163 MG/DL — HIGH (ref 70–99)
GLUCOSE BLDC GLUCOMTR-MCNC: 164 MG/DL — HIGH (ref 70–99)
GLUCOSE BLDC GLUCOMTR-MCNC: 200 MG/DL — HIGH (ref 70–99)
GLUCOSE BLDC GLUCOMTR-MCNC: 98 MG/DL — SIGNIFICANT CHANGE UP (ref 70–99)
GLUCOSE SERPL-MCNC: 136 MG/DL — HIGH (ref 70–99)
HCT VFR BLD CALC: 37.1 % — LOW (ref 42–52)
HGB BLD-MCNC: 11.9 G/DL — LOW (ref 14–18)
MCHC RBC-ENTMCNC: 30.1 PG — SIGNIFICANT CHANGE UP (ref 27–31)
MCHC RBC-ENTMCNC: 32.1 G/DL — SIGNIFICANT CHANGE UP (ref 32–37)
MCV RBC AUTO: 93.9 FL — SIGNIFICANT CHANGE UP (ref 80–94)
NRBC # BLD: 0 /100 WBCS — SIGNIFICANT CHANGE UP (ref 0–0)
PLATELET # BLD AUTO: 188 K/UL — SIGNIFICANT CHANGE UP (ref 130–400)
POTASSIUM SERPL-MCNC: 3.8 MMOL/L — SIGNIFICANT CHANGE UP (ref 3.5–5)
POTASSIUM SERPL-SCNC: 3.8 MMOL/L — SIGNIFICANT CHANGE UP (ref 3.5–5)
RBC # BLD: 3.95 M/UL — LOW (ref 4.7–6.1)
RBC # FLD: 16.9 % — HIGH (ref 11.5–14.5)
SODIUM SERPL-SCNC: 137 MMOL/L — SIGNIFICANT CHANGE UP (ref 135–146)
WBC # BLD: 8.66 K/UL — SIGNIFICANT CHANGE UP (ref 4.8–10.8)
WBC # FLD AUTO: 8.66 K/UL — SIGNIFICANT CHANGE UP (ref 4.8–10.8)

## 2019-01-30 RX ADMIN — HYDROMORPHONE HYDROCHLORIDE 0.25 MILLIGRAM(S): 2 INJECTION INTRAMUSCULAR; INTRAVENOUS; SUBCUTANEOUS at 19:58

## 2019-01-30 RX ADMIN — MONTELUKAST 10 MILLIGRAM(S): 4 TABLET, CHEWABLE ORAL at 11:06

## 2019-01-30 RX ADMIN — ATORVASTATIN CALCIUM 40 MILLIGRAM(S): 80 TABLET, FILM COATED ORAL at 21:15

## 2019-01-30 RX ADMIN — BUDESONIDE AND FORMOTEROL FUMARATE DIHYDRATE 2 PUFF(S): 160; 4.5 AEROSOL RESPIRATORY (INHALATION) at 07:42

## 2019-01-30 RX ADMIN — INSULIN GLARGINE 21 UNIT(S): 100 INJECTION, SOLUTION SUBCUTANEOUS at 21:15

## 2019-01-30 RX ADMIN — Medication 5 MILLIGRAM(S): at 21:15

## 2019-01-30 RX ADMIN — Medication 500 MILLIGRAM(S): at 05:12

## 2019-01-30 RX ADMIN — Medication 8 UNIT(S): at 11:33

## 2019-01-30 RX ADMIN — Medication 100 MILLIGRAM(S): at 05:12

## 2019-01-30 RX ADMIN — Medication 1: at 11:32

## 2019-01-30 RX ADMIN — BUDESONIDE AND FORMOTEROL FUMARATE DIHYDRATE 2 PUFF(S): 160; 4.5 AEROSOL RESPIRATORY (INHALATION) at 21:16

## 2019-01-30 RX ADMIN — AMIODARONE HYDROCHLORIDE 200 MILLIGRAM(S): 400 TABLET ORAL at 05:12

## 2019-01-30 RX ADMIN — Medication 8 UNIT(S): at 16:41

## 2019-01-30 RX ADMIN — Medication 8 UNIT(S): at 07:41

## 2019-01-30 RX ADMIN — Medication 500 MILLIGRAM(S): at 17:04

## 2019-01-30 RX ADMIN — Medication 1: at 07:41

## 2019-01-30 RX ADMIN — TAMSULOSIN HYDROCHLORIDE 0.4 MILLIGRAM(S): 0.4 CAPSULE ORAL at 21:15

## 2019-01-30 NOTE — DISCHARGE NOTE ADULT - HOSPITAL COURSE
HPI: 68 year old M PMH HTN. HLD, and DM, CHF, BPH, DLD, asthma/COPD, atrial fibrillation S/P ablation 9 months ago, valvular disorder S/P TAVR 9 months ago brought in by EMS after being found unresponsive on floor with urine and fecal incontinence and unwitnessed fall down 5 steps.  Patient was found at the bottom of 5 steps at his doctor's office, patient states he has no recollection of the fall.  Patient is unsure if he sustained head trauma, denies LOC and denies AC use. Patient mentioned that for the last week he was feeling dizzy and having unsteady gait from dizziness, she checked his pulse and it was low, sometimes reaching 40 , and his blood pressure was intermittently low. Patient mentioned he had another syncope episode around 2 month ago, he had was driving , felt dizzy, he has to pull over then he lost his consciousness and did not remember what happen. Patient  denies any chest pain during the last week, no SOB denied , nausea, vomiting, abdominal pain.  Patient was very confused on how the fall happened.  Patient was trauma alert in ED . In Ed patient was found in sinus bradycardia , and was given atropin in ED.  Of note, pt was on bumetanide and it was stopped by his nephrologist for dehydration and ANGELINA during the last week. (25 Jan 2019 21:49)    His bradycardia was believed to be a cause of Loss of consciousness and pacemaker was placed. Device Interrogation was done.  He was also found to be severely short of breath on ambulation dropping less than 88. Will discharge him home today on home oxygen of 2L.

## 2019-01-30 NOTE — PROGRESS NOTE ADULT - SUBJECTIVE AND OBJECTIVE BOX
PASTOR NOONAN MRN-[id number]    Hospitalist Note    Overnight events/Updates: patient upset sitting in chair because he wants to go home today and did not want to wait for O2 arrangement for home as he was hypoxic on ambulation off [place] O2.     Vital Signs Last 24 Hrs  T(C): 36.9 (30 Jan 2019 13:18), Max: 37.1 (29 Jan 2019 16:00)  T(F): 98.5 (30 Jan 2019 13:18), Max: 98.8 (29 Jan 2019 16:00)  HR: 60 (30 Jan 2019 13:18) (60 - 70)  BP: 141/67 (30 Jan 2019 13:18) (130/60 - 150/74)  BP(mean): 117 (29 Jan 2019 17:00) (89 - 117)  RR: 18 (30 Jan 2019 13:18) (18 - 24)  SpO2: 95% (29 Jan 2019 20:00) (94% - 95%)    Physical Examination:  General: AAO x 3   , NAD.  HEENT: PERRLA, EOMI.   CVS:  S1 & S2 appreciated, regular rate and rhythm  Lungs: decreased breath sounds bases.   Abdominal Examination: soft, nontender, nondistended, (obese) +ve BS.  Neuro: AAO x 3   . no focal deficits.   Extremity Examination: trace pedal edema.       ROS:   No chest pain, no shortness of breath.  All other systems reviewed and are within normal limits except for the complaints in the HPI.    MEDICATIONS  (STANDING):  amiodarone    Tablet 200 milliGRAM(s) Oral daily  atorvastatin 40 milliGRAM(s) Oral at bedtime  buDESOnide 160 MICROgram(s)/formoterol 4.5 MICROgram(s) Inhaler 2 Puff(s) Inhalation two times a day  cephalexin 500 milliGRAM(s) Oral every 12 hours  chlorhexidine 4% Liquid 1 Application(s) Topical <User Schedule>  dextrose 5%. 1000 milliLiter(s) (50 mL/Hr) IV Continuous <Continuous>  dextrose 50% Injectable 12.5 Gram(s) IV Push once  dextrose 50% Injectable 25 Gram(s) IV Push once  dextrose 50% Injectable 25 Gram(s) IV Push once  insulin glargine Injectable (LANTUS) 21 Unit(s) SubCutaneous at bedtime  insulin lispro (HumaLOG) corrective regimen sliding scale   SubCutaneous three times a day before meals  insulin lispro Injectable (HumaLOG) 8 Unit(s) SubCutaneous three times a day before meals  melatonin 5 milliGRAM(s) Oral at bedtime  metoprolol succinate  milliGRAM(s) Oral daily  montelukast 10 milliGRAM(s) Oral daily  tamsulosin 0.4 milliGRAM(s) Oral at bedtime    MEDICATIONS  (PRN):  dextrose 40% Gel 15 Gram(s) Oral once PRN Blood Glucose LESS THAN 70 milliGRAM(s)/deciliter  glucagon  Injectable 1 milliGRAM(s) IntraMuscular once PRN Glucose LESS THAN 70 milligrams/deciliter  HYDROmorphone  Injectable 0.25 milliGRAM(s) IV Push every 10 minutes PRN Moderate Pain (4 - 6)  ondansetron Injectable 4 milliGRAM(s) IV Push every 4 hours PRN Nausea and/or Vomiting                            11.9   8.66  )-----------( 188      ( 30 Jan 2019 12:20 )             37.1     01-30    137  |  95<L>  |  24<H>  ----------------------------<  136<H>  3.8   |  25  |  1.4    Ca    9.3      30 Jan 2019 12:20  Mg     2.2     01-29    TPro  6.7  /  Alb  3.3<L>  /  TBili  1.7<H>  /  DBili  x   /  AST  24  /  ALT  22  /  AlkPhos  87  01-29      Case discussed with housestaff & family  TITUS Loera [phone]

## 2019-01-30 NOTE — DISCHARGE NOTE ADULT - CARE PLAN
Principal Discharge DX:	Bradycardia  Goal:	resolved.  Assessment and plan of treatment:	You came here with a fall, loss of consciousness and bradycardia. Your fall was believed to be due to bradycardia and pacemaker was placed for that. Will discharge you home today  Secondary Diagnosis:	Shortness of breath  Goal:	Stable  Assessment and plan of treatment:	You were also found to be severely short of breath on ambulation due to asthma/copd. We are sending you home on home oxygen therapy. Principal Discharge DX:	Bradycardia  Goal:	resolved.  Assessment and plan of treatment:	You came here with a fall, loss of consciousness and bradycardia. Your fall was believed to be due to bradycardia and pacemaker was placed for that. Will discharge you home today.  Secondary Diagnosis:	Shortness of breath  Goal:	Stable  Assessment and plan of treatment:	You were also found to be severely short of breath on ambulation due to asthma/copd. We are sending you home on home oxygen therapy.

## 2019-01-30 NOTE — DIETITIAN INITIAL EVALUATION ADULT. - OTHER INFO
Initial assessment for poor PO intake. P/w: s/p fall. Syncopal episode 2/2 bradycardia 2/2 sick sinus syndrome. -Trend cardiac enzymes, monitor on telemetry and with daily EKGs. Hypoxemia at rest, likely secondary to COPD/asthma. ANGELINA on CKD- renal US pending. DM: on insulin.

## 2019-01-30 NOTE — DIETITIAN INITIAL EVALUATION ADULT. - DIET TYPE
reports ~25% PO intake up until this morning, >50% at breakfast today/consistent carbohydrate (no snacks)

## 2019-01-30 NOTE — DIETITIAN INITIAL EVALUATION ADULT. - FACTORS AFF FOOD INTAKE
denies GI distress, reports appetite's been fair since admit, but improved today and ate good breakfast, no chew/swallow difficulty, last BM 1/29/none

## 2019-01-30 NOTE — DIETITIAN INITIAL EVALUATION ADULT. - ENERGY NEEDS
calorie 1886-2265kcal (25-30kcal/kg IBW) for obesity  protein 67-75g (0.9-1.0g/kg IBW) for obesity/ANGELINA on CKD  fluid 1ml/kcal or per LIP

## 2019-01-30 NOTE — DISCHARGE NOTE ADULT - ADDITIONAL INSTRUCTIONS
Follow up with your PMD within a week.  Follow up with your cardiologist within a week.  Follow up with pulmonologist within a week.  Details of these doctors are provided below.

## 2019-01-30 NOTE — DISCHARGE NOTE ADULT - DISCHARGE TO
----- Message from Nikki Eng [phone]     Home with Home Care

## 2019-01-30 NOTE — PROGRESS NOTE ADULT - ASSESSMENT
Patient is a 68y old  Male who presents with a chief complaint of fall, LOC (29 Jan 2019 09:58)      syncope--2/2  sick sinus syndrome/bradycardia- h/o AF s/p ablation  -CT head negative- trauma w/u no fracture  -s/p ppm 1/28/19  -EP evaluation appreciated.  -PPM interrogated- working fine.   -echo with normal LVEF, grade II diastolic dysfunction, TAVR in place, mild to moderate MR, mild to moderate TR, PASP 54 mmHg, moderate Pulm HTN  -cont amiodarone and bblocker--- off  dig  to resume xarelto on 1/31/19 with follow up EP as outpatient.     ANGELINA on CKD stage 3 possibly overdiuresis.   -Cr improved, 1.4 today.  -hypokalemia resolved  nephro evaluated the patient.     acute hypoxic respiratory failure possibly grade II diastolic CHF exacerbation and underlying COPD  clinically improved   hypoxic to 88% on RA on ambulation  was diuresed cautiously due to worsening renal function.   repeat CXR today shows mild pulm. vascular congestion  home O2 to be arranged by  for discharge home as patient really wants to go home today.   c/w inhalers symbicort/spiriva.   -pulm f/u as outpatient---will need sleep study and pft    DM2  -cont lantus/humalog  -monitor fs  -diabetic diet    DVT/GI ppx    dispo: stable for discharge home today once home O2 arranged. Patient is a 68y old  Male who presents with a chief complaint of fall, LOC (29 Jan 2019 09:58)      syncope--2/2  sick sinus syndrome/bradycardia- h/o AF s/p ablation  -CT head negative- trauma w/u no fracture  -s/p ppm 1/28/19  -EP evaluation appreciated.  -PPM interrogated- working fine.   -echo with normal LVEF, grade II diastolic dysfunction, TAVR in place, mild to moderate MR, mild to moderate TR, PASP 54 mmHg, moderate Pulm HTN  -cont amiodarone and bblocker--- off  dig  to resume xarelto on 1/31/19 with follow up EP as outpatient.     ANGELINA on CKD stage 3 possibly overdiuresis.   -Cr improved, 1.4 today.  -hypokalemia resolved  nephro evaluated the patient.     acute hypoxic respiratory failure possibly grade II diastolic CHF exacerbation and underlying COPD  clinically improved   hypoxic to 88% on RA on ambulation  was diuresed cautiously due to worsening renal function.   repeat CXR today shows mild pulm. vascular congestion, IV diuresis with lasix 20mg today.   home O2 to be arranged by  for discharge home as patient really wants to go home today.   c/w inhalers symbicort/spiriva.   -pulm f/u as outpatient---will need sleep study and pft    DM2  -cont lantus/humalog  -monitor fs  -diabetic diet    DVT/GI ppx    dispo: stable for discharge home today once home O2 arranged. Patient is a 68y old  Male who presents with a chief complaint of fall, LOC (29 Jan 2019 09:58)      syncope--2/2  sick sinus syndrome/bradycardia- h/o AF s/p ablation  -CT head negative- trauma w/u no fracture  -s/p ppm 1/28/19  -EP evaluation appreciated.  -PPM interrogated- working fine.   -echo with normal LVEF, grade II diastolic dysfunction, TAVR in place, mild to moderate MR, mild to moderate TR, PASP 54 mmHg, moderate Pulm HTN  -cont amiodarone and bblocker--- off  dig  to resume xarelto on 1/31/19 with follow up EP as outpatient.     ANGELINA on CKD stage 3 possibly overdiuresis.   -Cr improved, 1.4 today.  -hypokalemia resolved  nephro evaluated the patient.     acute hypoxic respiratory failure possibly grade II diastolic CHF exacerbation and underlying COPD  clinically improved   hypoxic to 88% on RA on ambulation  was diuresed cautiously due to worsening renal function.   repeat CXR today shows mild pulm. vascular congestion, IV diuresis with lasix 20mg today.  c/w lasix 20mg daily for discharge as patient is just recovering from ANGELINA.   home O2 to be arranged by  for discharge home as patient really wants to go home today.   c/w inhalers symbicort/spiriva.   -pulm f/u as outpatient---will need sleep study and pft    DM2  -cont lantus/humalog  -monitor fs  -diabetic diet    DVT/GI ppx    dispo: stable for discharge home today once home O2 arranged.

## 2019-01-30 NOTE — DISCHARGE NOTE ADULT - MEDICATION SUMMARY - MEDICATIONS TO STOP TAKING
I will STOP taking the medications listed below when I get home from the hospital:    digoxin 250 mcg (0.25 mg) oral tablet  -- 1 tab(s) by mouth once a day

## 2019-01-30 NOTE — DISCHARGE NOTE ADULT - CARE PROVIDER_API CALL
Kassandra Varghese)  Medicine  Physicians  [address] [place] [address]  Phone: [phone]  Fax: [phone] Kassandra Varghese)  Medicine  Physicians  [address] [place] [address]  Phone: [phone]  Fax: [phone]    joann perdomo  Phone: (   )    -  Fax: (   )    -    Tu Horn (MD)  Critical Care Medicine; Pulmonary Disease; Sleep Medicine  [address] [place] [address]  Phone: [phone]  Fax: [phone]

## 2019-01-30 NOTE — MEDICAL STUDENT PROGRESS NOTE(EDUCATION) - SUBJECTIVE AND OBJECTIVE BOX
Reason for Admission: Fall/LOC    HPI: 68 year old M PMH HTN. HLD, and DM, CHF, BPH, DLD, asthma/COPD, atrial fibrillation S/P ablation 9 months ago, valvular disorder S/P TAVR 9 months ago brought in by EMS after being found unresponsive on floor with urine and fecal incontinence and unwitnessed fall down 5 steps.  Patient was found at the bottom of 5 steps at his doctor's office, patient states he has no recollection of the fall.  Patient is unsure if he sustained head trauma, denies LOC and denies AC use. Patient mentioned that for the last week he was feeling dizzy and having unsteady gait from dizziness, she checked his pulse and it was low, sometimes reaching 40 , and his blood pressure was intermittently low. Patient mentioned he had another syncope episode around 2 month ago, he had was driving , felt dizzy, he has to pull over then he lost his consciousness and did not remember what happen. Patient  denies any chest pain during the last week, no SOB denied , nausea, vomiting, abdominal pain.  Patient was very confused on how the fall happened.  Patient was trauma alert in ED . In Ed patient was found in sinus bradycardia , and was given atropin in ED.  Of note, pt was on bumetanide and it was stopped by his nephrologist for dehydration and ANGELINA during the last week. (25 Jan 2019 21:49)    PAST MEDICAL & SURGICAL HISTORY:  HLD (hyperlipidemia)  HTN (hypertension)  Diabetes  H/O prior ablation treatment  S/P TAVR (transcatheter aortic valve replacement)    Vital Signs Last 24 Hrs  T(C): 36.6 (30 Jan 2019 05:54)  T(F): 97.8 (30 Jan 2019 05:54)  HR: 60 (30 Jan 2019 05:54)  BP: 143/76 (30 Jan 2019 05:54)  RR: 20 (30 Jan 2019 05:54)  SpO2: 95% room air (29 Jan 2019 20:00)    Physical Exam:  GEN- no acute distress  PULM- fair air entry with decreased breath sounds in b/l bases  CVS- +s1/s2 RRR- +Left ACW device site c/d/i  GI- soft obese NT ND  EXT- mild edema bilateral legs    Labs:    POCT Blood Glucose: 163 (30 Jan 2019 07:18)             10.4   7.17  )-----------( 150      ( 29 Jan 2019 04:10 )             33.6     01-29    135  |  93<L>  |  33<H>  ----------------------------<  127<H>  4.1   |  27  |  1.5    Ca    8.9      29 Jan 2019 04:10  Mg     2.2     01-29    TPro  6.7  /  Alb  3.3<L>  /  TBili  1.7<H>  /  DBili  x   /  AST  24  /  ALT  22  /  AlkPhos  87  01-29              MEDICATIONS  (STANDING):  acetaminophen   Tablet .. 975 milliGRAM(s) Oral once  atorvastatin 40 milliGRAM(s) Oral at bedtime  buDESOnide 160 MICROgram(s)/formoterol 4.5 MICROgram(s) Inhaler 2 Puff(s) Inhalation two times a day  cephalexin 500 milliGRAM(s) Oral every 12 hours  chlorhexidine 4% Liquid 1 Application(s) Topical <User Schedule>  dextrose 5%. 1000 milliLiter(s) (50 mL/Hr) IV Continuous <Continuous>  dextrose 50% Injectable 12.5 Gram(s) IV Push once  dextrose 50% Injectable 25 Gram(s) IV Push once  dextrose 50% Injectable 25 Gram(s) IV Push once  furosemide   Injectable 20 milliGRAM(s) IV Push once  furosemide   Injectable 40 milliGRAM(s) IV Push once  insulin glargine Injectable (LANTUS) 21 Unit(s) SubCutaneous at bedtime  insulin lispro (HumaLOG) corrective regimen sliding scale   SubCutaneous three times a day before meals  insulin lispro Injectable (HumaLOG) 8 Unit(s) SubCutaneous three times a day before meals  montelukast 10 milliGRAM(s) Oral daily  tamsulosin 0.4 milliGRAM(s) Oral at bedtime  vancomycin  IVPB 1500 milliGRAM(s) IV Intermittent every 12 hours      Assessment and Plan:     #Syncope--2/2  sick sinus syndrome/bradycardia- h/o AF s/p ablation  -CT head negative- trauma w/u no fracture  -s/p ppm 1/28/19  -f/u with EPS and device interrogation  -echo with normal LVEF, grade II diastolic dysfunction, TAVR in place, mild to moderate MR, mild to moderate TR, PASP 54 mmHg, moderate Pulm HTN  -cont amiodarone and blocker---discontinue dig - HR within normal range   -start xerelto on 1- per cardiology    #ANGELINA on CKD  -improving cr down from 3.3--->1.5  -hypokalemia resolved  -monitor bun/cr and electrolytes    #acute hypoxic respiratory failure- with fluid overload on cxr  -improved- pulse ox 95% on room air, no sob today  -monitor i/o and daily weight  -monitor O2 sat  -avoid over diuresis and worsening of ANGELINA  -am cxr  -pulm f/u as outpatient---will need sleep study and pft  -check ABG  -check pulse ox off o2 and with ambulation    #DM2  -cont lantus/humalog  -monitor fs  -diabetic diet    #DVT/GI ppx  full code  anticipate likely dc home in next 24-48 hrs (appears to be complicated family concerns---lives with son but is estranged from him)--pcp is Dr. Sullivan who he says he will f/u with in few days. pt wants to go home  also explained to patient that if he refuses to stay in hospital would need to sign out against medical advice and take risk of complications including but not limited to death, mi, respiratory failure in his own hands  -SW/CM following Reason for Admission: Fall/LOC    HPI: 68 year old M PMH HTN. HLD, and DM, CHF, BPH, DLD, asthma/COPD, atrial fibrillation S/P ablation 9 months ago, valvular disorder S/P TAVR 9 months ago brought in by EMS after being found unresponsive on floor with urine and fecal incontinence and unwitnessed fall down 5 steps.  Patient was found at the bottom of 5 steps at his doctor's office, patient states he has no recollection of the fall.  Patient is unsure if he sustained head trauma, denies LOC and denies AC use. Patient mentioned that for the last week he was feeling dizzy and having unsteady gait from dizziness, she checked his pulse and it was low, sometimes reaching 40 , and his blood pressure was intermittently low. Patient mentioned he had another syncope episode around 2 month ago, he had was driving , felt dizzy, he has to pull over then he lost his consciousness and did not remember what happen. Patient  denies any chest pain during the last week, no SOB denied , nausea, vomiting, abdominal pain.  Patient was very confused on how the fall happened.  Patient was trauma alert in ED . In Ed patient was found in sinus bradycardia , and was given atropin in ED.  Of note, pt was on bumetanide and it was stopped by his nephrologist for dehydration and ANGELINA during the last week. (25 Jan 2019 21:49)    PAST MEDICAL & SURGICAL HISTORY:  HLD (hyperlipidemia)  HTN (hypertension)  Diabetes  H/O prior ablation treatment  S/P TAVR (transcatheter aortic valve replacement)    Vital Signs Last 24 Hrs  T(C): 36.6 (30 Jan 2019 05:54)  T(F): 97.8 (30 Jan 2019 05:54)  HR: 60 (30 Jan 2019 05:54)  BP: 143/76 (30 Jan 2019 05:54)  RR: 20 (30 Jan 2019 05:54)  SpO2: 95% room air (29 Jan 2019 20:00)    Physical Exam:  GEN- no acute distress  PULM- fair air entry with decreased breath sounds in b/l bases  CVS- +s1/s2 RRR- +Left ACW device site c/d/i  GI- soft obese NT ND  EXT- mild edema bilateral legs    Labs:    POCT Blood Glucose: 163 (30 Jan 2019 07:18)             10.4   7.17  )-----------( 150      ( 29 Jan 2019 04:10 )             33.6     01-29    135  |  93<L>  |  33<H>  ----------------------------<  127<H>  4.1   |  27  |  1.5    Ca    8.9      29 Jan 2019 04:10  Mg     2.2     01-29    TPro  6.7  /  Alb  3.3<L>  /  TBili  1.7<H>  /  DBili  x   /  AST  24  /  ALT  22  /  AlkPhos  87  01-29              MEDICATIONS  (STANDING):  acetaminophen   Tablet .. 975 milliGRAM(s) Oral once  atorvastatin 40 milliGRAM(s) Oral at bedtime  buDESOnide 160 MICROgram(s)/formoterol 4.5 MICROgram(s) Inhaler 2 Puff(s) Inhalation two times a day  cephalexin 500 milliGRAM(s) Oral every 12 hours  chlorhexidine 4% Liquid 1 Application(s) Topical <User Schedule>  dextrose 5%. 1000 milliLiter(s) (50 mL/Hr) IV Continuous <Continuous>  dextrose 50% Injectable 12.5 Gram(s) IV Push once  dextrose 50% Injectable 25 Gram(s) IV Push once  dextrose 50% Injectable 25 Gram(s) IV Push once  furosemide   Injectable 20 milliGRAM(s) IV Push once  furosemide   Injectable 40 milliGRAM(s) IV Push once  insulin glargine Injectable (LANTUS) 21 Unit(s) SubCutaneous at bedtime  insulin lispro (HumaLOG) corrective regimen sliding scale   SubCutaneous three times a day before meals  insulin lispro Injectable (HumaLOG) 8 Unit(s) SubCutaneous three times a day before meals  montelukast 10 milliGRAM(s) Oral daily  tamsulosin 0.4 milliGRAM(s) Oral at bedtime  vancomycin  IVPB 1500 milliGRAM(s) IV Intermittent every 12 hours      Assessment and Plan:     #Syncope--2/2  sick sinus syndrome/bradycardia- h/o AF s/p ablation  -CT head negative- trauma w/u no fracture  -s/p ppm 1/28/19  -device interrogation done.  -echo with normal LVEF, grade II diastolic dysfunction, TAVR in place, mild to moderate MR, mild to moderate TR, PASP 54 mmHg, moderate Pulm HTN  -cont amiodarone and blocker---discontinue dig - HR within normal range   -start xerelto on 1- per cardiology    #ANGELINA on CKD  -improving cr down from 3.3--->1.5  -hypokalemia resolved  -monitor bun/cr and electrolytes    #acute hypoxic respiratory failure- secondary to COPD/Asthma/CHF - treated and resolved   with fluid overload on cxr - treated and resolved   pulse ox 95% on room air, no sob today  -monitor i/o and daily weight  -monitor O2 sat  -avoid over diuresis and worsening of ANGELINA  -am cxr  -pulm f/u as outpatient---will need sleep study and pft  -Patient was treated with IV LASIX  and nebulization treatments but inspite of continuous medical therapy,    His O2 saturation stayed 96 percent on room air at rest,  88 percent on room air with ambulation,  and 96 percent on 2L with ambulation.    Patient was treated in a chronic stable state and he is aware that he is going home on home oxygen therapy.    #DM2  -cont lantus/humalog  -monitor fs  -diabetic diet    #DVT/GI ppx  full code Reason for Admission: Fall/LOC    HPI: 68 year old M PMH HTN. HLD, and DM, CHF, BPH, DLD, asthma/COPD, atrial fibrillation S/P ablation 9 months ago, valvular disorder S/P TAVR 9 months ago brought in by EMS after being found unresponsive on floor with urine and fecal incontinence and unwitnessed fall down 5 steps.  Patient was found at the bottom of 5 steps at his doctor's office, patient states he has no recollection of the fall.  Patient is unsure if he sustained head trauma, denies LOC and denies AC use. Patient mentioned that for the last week he was feeling dizzy and having unsteady gait from dizziness, she checked his pulse and it was low, sometimes reaching 40 , and his blood pressure was intermittently low. Patient mentioned he had another syncope episode around 2 month ago, he had was driving , felt dizzy, he has to pull over then he lost his consciousness and did not remember what happen. Patient  denies any chest pain during the last week, no SOB denied , nausea, vomiting, abdominal pain.  Patient was very confused on how the fall happened.  Patient was trauma alert in ED . In Ed patient was found in sinus bradycardia , and was given atropin in ED.  Of note, pt was on bumetanide and it was stopped by his nephrologist for dehydration and ANGELINA during the last week. (25 Jan 2019 21:49)    PAST MEDICAL & SURGICAL HISTORY:  HLD (hyperlipidemia)  HTN (hypertension)  Diabetes  H/O prior ablation treatment  S/P TAVR (transcatheter aortic valve replacement)    Vital Signs Last 24 Hrs  T(C): 36.6 (30 Jan 2019 05:54)  T(F): 97.8 (30 Jan 2019 05:54)  HR: 60 (30 Jan 2019 05:54)  BP: 143/76 (30 Jan 2019 05:54)  RR: 20 (30 Jan 2019 05:54)  SpO2: 95% room air (29 Jan 2019 20:00)    Physical Exam:  GEN- no acute distress  PULM- fair air entry with decreased breath sounds in b/l bases  CVS- +s1/s2 RRR- +Left ACW device site c/d/i  GI- soft obese NT ND  EXT- mild edema bilateral legs    Labs:    POCT Blood Glucose: 163 (30 Jan 2019 07:18)             10.4   7.17  )-----------( 150      ( 29 Jan 2019 04:10 )             33.6     01-29    135  |  93<L>  |  33<H>  ----------------------------<  127<H>  4.1   |  27  |  1.5    Ca    8.9      29 Jan 2019 04:10  Mg     2.2     01-29    TPro  6.7  /  Alb  3.3<L>  /  TBili  1.7<H>  /  DBili  x   /  AST  24  /  ALT  22  /  AlkPhos  87  01-29              MEDICATIONS  (STANDING):  acetaminophen   Tablet .. 975 milliGRAM(s) Oral once  atorvastatin 40 milliGRAM(s) Oral at bedtime  buDESOnide 160 MICROgram(s)/formoterol 4.5 MICROgram(s) Inhaler 2 Puff(s) Inhalation two times a day  cephalexin 500 milliGRAM(s) Oral every 12 hours  chlorhexidine 4% Liquid 1 Application(s) Topical <User Schedule>  dextrose 5%. 1000 milliLiter(s) (50 mL/Hr) IV Continuous <Continuous>  dextrose 50% Injectable 12.5 Gram(s) IV Push once  dextrose 50% Injectable 25 Gram(s) IV Push once  dextrose 50% Injectable 25 Gram(s) IV Push once  furosemide   Injectable 20 milliGRAM(s) IV Push once  furosemide   Injectable 40 milliGRAM(s) IV Push once  insulin glargine Injectable (LANTUS) 21 Unit(s) SubCutaneous at bedtime  insulin lispro (HumaLOG) corrective regimen sliding scale   SubCutaneous three times a day before meals  insulin lispro Injectable (HumaLOG) 8 Unit(s) SubCutaneous three times a day before meals  montelukast 10 milliGRAM(s) Oral daily  tamsulosin 0.4 milliGRAM(s) Oral at bedtime  vancomycin  IVPB 1500 milliGRAM(s) IV Intermittent every 12 hours      Assessment and Plan:     #Syncope--2/2  sick sinus syndrome/bradycardia- h/o AF s/p ablation  -CT head negative- trauma w/u no fracture  -s/p ppm 1/28/19  -device interrogation done.  -echo with normal LVEF, grade II diastolic dysfunction, TAVR in place, mild to moderate MR, mild to moderate TR, PASP 54 mmHg, moderate Pulm HTN  -cont amiodarone and blocker---discontinue dig - HR within normal range   -start xerelto on 1- per cardiology    #ANGELINA on CKD  -improving cr down from 3.3--->1.5  -hypokalemia resolved  -monitor bun/cr and electrolytes    #acute hypoxic respiratory failure- secondary to COPD/Asthma/CHF - treated and resolved   with fluid overload on cxr - treated and resolved   pulse ox 95% on room air, no sob today  -monitor i/o and daily weight  -monitor O2 sat  -avoid over diuresis and worsening of ANGELINA  -am cxr  -pulm f/u as outpatient---will need sleep study and pft  -Patient was treated with IV LASIX  and nebulization treatments but inspite of continuous medical therapy,    His O2 saturation stayed 96 percent on room air at rest,  88 percent on room air with ambulation,  and 96 percent on 2L with ambulation.    Patient was treated in a chronic stable state and he is aware that he is going home on home oxygen therapy.  -Oxygen to be delievered around 7:30 pm today.  -Refuses to go home that late.  -Anticipated for discharge tomorrow.    #DM2  -cont lantus/humalog  -monitor fs  -diabetic diet    #DVT/GI ppx  full code

## 2019-01-30 NOTE — DISCHARGE NOTE ADULT - PATIENT PORTAL LINK FT
You can access the [facility], offered by [facility], by registering with the following website: [email or web address]

## 2019-01-30 NOTE — DISCHARGE NOTE ADULT - MEDICATION SUMMARY - MEDICATIONS TO TAKE
I will START or STAY ON the medications listed below when I get home from the hospital:    tamsulosin 0.4 mg oral capsule  -- 1 cap(s) by mouth once a day  -- Indication: For Benign Prostatic Hyperplasia    amiodarone 200 mg oral tablet  -- 1 tab(s) by mouth once a day  -- Indication: For Atrial fibrillation    Xarelto  -- 15 milligram(s) by mouth once a day at dinner time  -- Indication: For Atrial Fibrillation    atorvastatin 40 mg oral tablet  -- 1 tab(s) by mouth once a day  -- Indication: For preventive    metoprolol succinate 100 mg oral tablet, extended release  -- 1 tab(s) by mouth once a day  -- Indication: For Atrial fibrillation    Symbicort 160 mcg-4.5 mcg/inh inhalation aerosol  -- 2 puff(s) inhaled 2 times a day  -- Indication: For COPD    montelukast 10 mg oral tablet  -- 1 tab(s) by mouth once a day  -- Indication: For ASTHMA I will START or STAY ON the medications listed below when I get home from the hospital:    tamsulosin 0.4 mg oral capsule  -- 1 cap(s) by mouth once a day  -- Indication: For Benign Prostatic Hyperplasia    amiodarone 200 mg oral tablet  -- 1 tab(s) by mouth once a day  -- Indication: For Atrial fibrillation    Xarelto  -- 15 milligram(s) by mouth once a day at dinner time  -- Indication: For Atrial Fibrillation    atorvastatin 40 mg oral tablet  -- 1 tab(s) by mouth once a day  -- Indication: For preventive    metoprolol succinate 100 mg oral tablet, extended release  -- 1 tab(s) by mouth once a day  -- Indication: For Atrial fibrillation    Symbicort 160 mcg-4.5 mcg/inh inhalation aerosol  -- 2 puff(s) inhaled 2 times a day  -- Indication: For COPD    cephalexin 500 mg oral capsule  -- 1 cap(s) by mouth every 12 hours   -- Indication: For Recent Pacemaker Placement    montelukast 10 mg oral tablet  -- 1 tab(s) by mouth once a day  -- Indication: For ASTHMA

## 2019-01-30 NOTE — DISCHARGE NOTE ADULT - PLAN OF CARE
resolved. You came here with a fall, loss of consciousness and bradycardia. Your fall was believed to be due to bradycardia and pacemaker was placed for that. Will discharge you home today Stable You were also found to be severely short of breath on ambulation due to asthma/copd. We are sending you home on home oxygen therapy. You came here with a fall, loss of consciousness and bradycardia. Your fall was believed to be due to bradycardia and pacemaker was placed for that. Will discharge you home today.

## 2019-01-30 NOTE — DISCHARGE NOTE ADULT - PROVIDER TOKENS
TOKCLAYTON:'[id number]' TOKEN:'[id number]',FREE:[LAST:[leanne],FIRST:[joann],PHONE:[(   )    -],FAX:[(   )    -]],TOKEN:'[id number]'

## 2019-01-31 VITALS
RESPIRATION RATE: 18 BRPM | HEART RATE: 60 BPM | DIASTOLIC BLOOD PRESSURE: 65 MMHG | OXYGEN SATURATION: 97 % | TEMPERATURE: 99 F | SYSTOLIC BLOOD PRESSURE: 134 MMHG

## 2019-01-31 LAB
GLUCOSE BLDC GLUCOMTR-MCNC: 116 MG/DL — HIGH (ref 70–99)
GLUCOSE BLDC GLUCOMTR-MCNC: 148 MG/DL — HIGH (ref 70–99)

## 2019-01-31 RX ORDER — RIVAROXABAN 15 MG-20MG
0 KIT ORAL
Qty: 0 | Refills: 0 | COMMUNITY

## 2019-01-31 RX ORDER — RIVAROXABAN 15 MG-20MG
15 KIT ORAL
Qty: 0 | Refills: 0 | COMMUNITY

## 2019-01-31 RX ORDER — CEPHALEXIN 500 MG
1 CAPSULE ORAL
Qty: 6 | Refills: 0 | OUTPATIENT
Start: 2019-01-31 | End: 2019-02-02

## 2019-01-31 RX ADMIN — AMIODARONE HYDROCHLORIDE 200 MILLIGRAM(S): 400 TABLET ORAL at 05:22

## 2019-01-31 RX ADMIN — Medication 500 MILLIGRAM(S): at 05:22

## 2019-01-31 RX ADMIN — MONTELUKAST 10 MILLIGRAM(S): 4 TABLET, CHEWABLE ORAL at 11:51

## 2019-01-31 RX ADMIN — BUDESONIDE AND FORMOTEROL FUMARATE DIHYDRATE 2 PUFF(S): 160; 4.5 AEROSOL RESPIRATORY (INHALATION) at 07:56

## 2019-01-31 RX ADMIN — Medication 8 UNIT(S): at 07:54

## 2019-01-31 RX ADMIN — Medication 100 MILLIGRAM(S): at 05:22

## 2019-01-31 NOTE — PROGRESS NOTE ADULT - SUBJECTIVE AND OBJECTIVE BOX
PASTOR NOONAN MRN-[id number]    Hospitalist Note    Overnight events/Updates: patient upset sitting in chair because he wants to go home today and did not want to wait for O2 arrangement for home as he was hypoxic on ambulation off [place] O2.     Vital Signs Last 24 Hrs  T(C): 37 (31 Jan 2019 12:46), Max: 37.1 (31 Jan 2019 05:43)  T(F): 98.6 (31 Jan 2019 12:46), Max: 98.7 (31 Jan 2019 05:43)  HR: 60 (31 Jan 2019 12:46) (60 - 60)  BP: 134/65 (31 Jan 2019 12:46) (109/55 - 146/65)  RR: 18 (31 Jan 2019 12:46) (18 - 19)  SpO2: 97% (31 Jan 2019 12:46) (97% - 97%)      Physical Examination:  General: AAO x 3   , NAD.  HEENT: PERRLA, EOMI.   CVS:  S1 & S2 appreciated, regular rate and rhythm  Lungs: decreased breath sounds bases.   Abdominal Examination: soft, nontender, nondistended, (obese) +ve BS.  Neuro: AAO x 3   . no focal deficits.   Extremity Examination: trace pedal edema.       ROS:   No chest pain, no shortness of breath.  All other systems reviewed and are within normal limits except for the complaints in the HPI.      LAB -                          11.9   8.66  )-----------( 188      ( 30 Jan 2019 12:20 )             37.1     01-30    137  |  95<L>  |  24<H>  ----------------------------<  136<H>  3.8   |  25  |  1.4    Ca    9.3      30 Jan 2019 12:20  Mg     2.2     01-29    TPro  6.7  /  Alb  3.3<L>  /  TBili  1.7<H>  /  DBili  x   /  AST  24  /  ALT  22  /  AlkPhos  87  01-29

## 2019-01-31 NOTE — PHYSICAL THERAPY INITIAL EVALUATION ADULT - GENERAL OBSERVATIONS, REHAB EVAL
Chart reviewed, attempted to see pt for PT IE, pt rec sitting OOB in NAD, +O2 on at 2L,  pt declined PT 2* he stated he is going home today and does not want to exert himself, pt appeared frustrated about going home with O2, PT explained the process to pt, showed pt how to use the O2 tank, confirmed home concentrator with d/c planner and explained that to pt, spoke with  and suggested that she speak with pt, social hx obtained, ROM is WFL, pt left as found in NAD, will f/u 9:37- 10:08 31 min  Chart reviewed, attempted to see pt for PT IE, pt rec sitting OOB in NAD, +O2 on at 2L,  pt declined PT 2* he stated he is going home today and does not want to exert himself, pt appeared frustrated about going home with O2, PT explained the process to pt, showed pt how to use the O2 tank, confirmed home concentrator with d/c planner and explained that to pt, spoke with  and suggested that she speak with pt, social hx obtained, ROM is WFL, pt left as found in NAD, will f/u

## 2019-01-31 NOTE — PHYSICAL THERAPY INITIAL EVALUATION ADULT - LEVEL OF INDEPENDENCE: GAIT, REHAB EVAL
pt declined 2* he does not want to exert himself, pt stated he can walk, RN confirmed that pt does amb to the bathroom with +SOB

## 2019-01-31 NOTE — PROGRESS NOTE ADULT - ATTENDING COMMENTS
d/c home today
68 yr old male presented with symptomatic bradycardia and s/p fall. I have examined patient independently and agree with plan of resident.  # bradycardia-- off AV johnathan blocking agents and awaiting PPM. off xarelto--digoxin level was not elevated and TSH was normal.  # morbid obesity--snoring while sleeping --has AMY  # atrial fibrillation s/p ablation in the past.  #ANGELINA on CKD3-- now resolving
Pt seen and examined. Complains of mild pain around incision.     CXR PA/Lateral is washed out. Repeat PA/Lateral to visualize lead tips.   Interrogation completed  Keflex 500mg PO BID x 5 days  No shower x 3 days. May shower on Friday  Can resume BB and Amio today.   Resume Xarelto on Thurs 1/31.  Follow up with Dr. Mauro in 3-4 weeks

## 2019-01-31 NOTE — PROGRESS NOTE ADULT - ASSESSMENT
Patient is a 68y old  Male who presents with a chief complaint of fall, LOC (29 Jan 2019 09:58)      syncope--2/2  sick sinus syndrome/bradycardia- h/o AF s/p ablation  -CT head negative- trauma w/u no fracture  -s/p ppm 1/28/19  -EP evaluation appreciated.  -PPM interrogated- working fine.   -echo with normal LVEF, grade II diastolic dysfunction, TAVR in place, mild to moderate MR, mild to moderate TR, PASP 54 mmHg, moderate Pulm HTN  -cont amiodarone and bblocker--- off  dig  to resume xarelto on 1/31/19 with follow up EP as outpatient.     ANGELINA on CKD stage 3 possibly overdiuresis.   -Cr improved, 1.4 today.  -hypokalemia resolved  nephro evaluated the patient.     acute hypoxic respiratory failure possibly grade II diastolic CHF exacerbation and underlying COPD  clinically improved   hypoxic to 88% on RA on ambulation    repeat CXR today shows mild pulm. vascular congestion, IV diuresis with lasix 20mg today.  c/w lasix 20mg daily for discharge as patient is just recovering from ANGELINA.   home O2 to be arranged by  for discharge home as patient really wants to go home today.   c/w inhalers symbicort/spiriva.   -pulm f/u as outpatient---will need sleep study and pft    DM2  -cont lantus/humalog  -monitor fs  -diabetic diet    DVT/GI ppx    dispo: stable for discharge home today once home O2 arranged.

## 2019-01-31 NOTE — PROGRESS NOTE ADULT - REASON FOR ADMISSION
fall, LOC

## 2019-02-12 DIAGNOSIS — R74.8 ABNORMAL LEVELS OF OTHER SERUM ENZYMES: ICD-10-CM

## 2019-02-12 DIAGNOSIS — N18.3 CHRONIC KIDNEY DISEASE, STAGE 3 (MODERATE): ICD-10-CM

## 2019-02-12 DIAGNOSIS — I49.5 SICK SINUS SYNDROME: ICD-10-CM

## 2019-02-12 DIAGNOSIS — J45.909 UNSPECIFIED ASTHMA, UNCOMPLICATED: ICD-10-CM

## 2019-02-12 DIAGNOSIS — R32 UNSPECIFIED URINARY INCONTINENCE: ICD-10-CM

## 2019-02-12 DIAGNOSIS — I13.0 HYPERTENSIVE HEART AND CHRONIC KIDNEY DISEASE WITH HEART FAILURE AND STAGE 1 THROUGH STAGE 4 CHRONIC KIDNEY DISEASE, OR UNSPECIFIED CHRONIC KIDNEY DISEASE: ICD-10-CM

## 2019-02-12 DIAGNOSIS — R55 SYNCOPE AND COLLAPSE: ICD-10-CM

## 2019-02-12 DIAGNOSIS — Z87.891 PERSONAL HISTORY OF NICOTINE DEPENDENCE: ICD-10-CM

## 2019-02-12 DIAGNOSIS — Z79.4 LONG TERM (CURRENT) USE OF INSULIN: ICD-10-CM

## 2019-02-12 DIAGNOSIS — N17.9 ACUTE KIDNEY FAILURE, UNSPECIFIED: ICD-10-CM

## 2019-02-12 DIAGNOSIS — I27.20 PULMONARY HYPERTENSION, UNSPECIFIED: ICD-10-CM

## 2019-02-12 DIAGNOSIS — E87.6 HYPOKALEMIA: ICD-10-CM

## 2019-02-12 DIAGNOSIS — J44.9 CHRONIC OBSTRUCTIVE PULMONARY DISEASE, UNSPECIFIED: ICD-10-CM

## 2019-02-12 DIAGNOSIS — E66.01 MORBID (SEVERE) OBESITY DUE TO EXCESS CALORIES: ICD-10-CM

## 2019-02-12 DIAGNOSIS — I48.0 PAROXYSMAL ATRIAL FIBRILLATION: ICD-10-CM

## 2019-02-12 DIAGNOSIS — D53.9 NUTRITIONAL ANEMIA, UNSPECIFIED: ICD-10-CM

## 2019-02-12 DIAGNOSIS — E11.22 TYPE 2 DIABETES MELLITUS WITH DIABETIC CHRONIC KIDNEY DISEASE: ICD-10-CM

## 2019-02-12 DIAGNOSIS — S80.212A ABRASION, LEFT KNEE, INITIAL ENCOUNTER: ICD-10-CM

## 2019-02-12 DIAGNOSIS — M89.9 DISORDER OF BONE, UNSPECIFIED: ICD-10-CM

## 2019-02-12 DIAGNOSIS — N40.0 BENIGN PROSTATIC HYPERPLASIA WITHOUT LOWER URINARY TRACT SYMPTOMS: ICD-10-CM

## 2019-02-12 DIAGNOSIS — S80.211A ABRASION, RIGHT KNEE, INITIAL ENCOUNTER: ICD-10-CM

## 2019-02-12 DIAGNOSIS — I25.10 ATHEROSCLEROTIC HEART DISEASE OF NATIVE CORONARY ARTERY WITHOUT ANGINA PECTORIS: ICD-10-CM

## 2019-02-12 DIAGNOSIS — Z95.818 PRESENCE OF OTHER CARDIAC IMPLANTS AND GRAFTS: ICD-10-CM

## 2019-02-12 DIAGNOSIS — I08.1 RHEUMATIC DISORDERS OF BOTH MITRAL AND TRICUSPID VALVES: ICD-10-CM

## 2019-02-12 DIAGNOSIS — G47.33 OBSTRUCTIVE SLEEP APNEA (ADULT) (PEDIATRIC): ICD-10-CM

## 2019-02-12 DIAGNOSIS — R40.2412 GLASGOW COMA SCALE SCORE 13-15, AT ARRIVAL TO EMERGENCY DEPARTMENT: ICD-10-CM

## 2019-02-12 DIAGNOSIS — J96.01 ACUTE RESPIRATORY FAILURE WITH HYPOXIA: ICD-10-CM

## 2019-02-12 DIAGNOSIS — Z95.2 PRESENCE OF PROSTHETIC HEART VALVE: ICD-10-CM

## 2019-02-12 DIAGNOSIS — I50.33 ACUTE ON CHRONIC DIASTOLIC (CONGESTIVE) HEART FAILURE: ICD-10-CM

## 2019-10-30 PROBLEM — Z00.00 ENCOUNTER FOR PREVENTIVE HEALTH EXAMINATION: Status: ACTIVE | Noted: 2019-10-30
